# Patient Record
Sex: MALE | Race: BLACK OR AFRICAN AMERICAN | NOT HISPANIC OR LATINO | Employment: OTHER | URBAN - METROPOLITAN AREA
[De-identification: names, ages, dates, MRNs, and addresses within clinical notes are randomized per-mention and may not be internally consistent; named-entity substitution may affect disease eponyms.]

---

## 2019-01-01 ENCOUNTER — TELEPHONE (OUTPATIENT)
Dept: GASTROENTEROLOGY | Facility: CLINIC | Age: 74
End: 2019-01-01

## 2019-01-01 ENCOUNTER — DOCUMENTATION ONLY (OUTPATIENT)
Dept: INFUSION THERAPY | Facility: HOSPITAL | Age: 74
End: 2019-01-01

## 2019-01-01 ENCOUNTER — OFFICE VISIT (OUTPATIENT)
Dept: HEMATOLOGY/ONCOLOGY | Facility: CLINIC | Age: 74
End: 2019-01-01
Payer: MEDICARE

## 2019-01-01 ENCOUNTER — TELEPHONE (OUTPATIENT)
Dept: NEUROLOGY | Facility: HOSPITAL | Age: 74
End: 2019-01-01

## 2019-01-01 ENCOUNTER — DOCUMENTATION ONLY (OUTPATIENT)
Dept: PHARMACY | Facility: AMBULARY SURGERY CENTER | Age: 74
End: 2019-01-01

## 2019-01-01 ENCOUNTER — LAB VISIT (OUTPATIENT)
Dept: LAB | Facility: HOSPITAL | Age: 74
End: 2019-01-01
Attending: INTERNAL MEDICINE
Payer: MEDICARE

## 2019-01-01 ENCOUNTER — TELEPHONE (OUTPATIENT)
Dept: ENDOSCOPY | Facility: HOSPITAL | Age: 74
End: 2019-01-01

## 2019-01-01 ENCOUNTER — INFUSION (OUTPATIENT)
Dept: INFUSION THERAPY | Facility: HOSPITAL | Age: 74
End: 2019-01-01
Attending: INTERNAL MEDICINE
Payer: MEDICARE

## 2019-01-01 ENCOUNTER — DOCUMENTATION ONLY (OUTPATIENT)
Dept: NEUROLOGY | Facility: HOSPITAL | Age: 74
End: 2019-01-01

## 2019-01-01 ENCOUNTER — HOSPITAL ENCOUNTER (OUTPATIENT)
Dept: RADIOLOGY | Facility: HOSPITAL | Age: 74
Discharge: HOME | End: 2019-03-22
Attending: INTERNAL MEDICINE
Payer: MEDICARE

## 2019-01-01 ENCOUNTER — TELEPHONE (OUTPATIENT)
Dept: HEMATOLOGY/ONCOLOGY | Facility: CLINIC | Age: 74
End: 2019-01-01

## 2019-01-01 ENCOUNTER — ANESTHESIA EVENT (OUTPATIENT)
Dept: ENDOSCOPY | Facility: HOSPITAL | Age: 74
End: 2019-01-01
Payer: MEDICARE

## 2019-01-01 ENCOUNTER — ANESTHESIA EVENT (OUTPATIENT)
Dept: ENDOSCOPY | Facility: HOSPITAL | Age: 74
DRG: 436 | End: 2019-01-01
Payer: MEDICARE

## 2019-01-01 ENCOUNTER — ANESTHESIA (OUTPATIENT)
Dept: SURGERY | Facility: HOSPITAL | Age: 74
DRG: 436 | End: 2019-01-01
Payer: MEDICARE

## 2019-01-01 ENCOUNTER — DOCUMENTATION ONLY (OUTPATIENT)
Dept: HEMATOLOGY/ONCOLOGY | Facility: CLINIC | Age: 74
End: 2019-01-01

## 2019-01-01 ENCOUNTER — HOSPITAL ENCOUNTER (INPATIENT)
Facility: HOSPITAL | Age: 74
LOS: 4 days | Discharge: HOME | DRG: 436 | End: 2019-03-15
Attending: SURGERY | Admitting: SURGERY
Payer: MEDICARE

## 2019-01-01 ENCOUNTER — HOSPITAL ENCOUNTER (OUTPATIENT)
Facility: HOSPITAL | Age: 74
Discharge: HOME | End: 2019-02-05
Attending: INTERNAL MEDICINE | Admitting: INTERNAL MEDICINE
Payer: MEDICARE

## 2019-01-01 ENCOUNTER — INITIAL CONSULT (OUTPATIENT)
Dept: HEMATOLOGY/ONCOLOGY | Facility: CLINIC | Age: 74
End: 2019-01-01
Payer: MEDICARE

## 2019-01-01 ENCOUNTER — OFFICE VISIT (OUTPATIENT)
Dept: NEUROLOGY | Facility: HOSPITAL | Age: 74
DRG: 436 | End: 2019-01-01
Attending: SURGERY
Payer: MEDICARE

## 2019-01-01 ENCOUNTER — ANESTHESIA EVENT (OUTPATIENT)
Dept: SURGERY | Facility: HOSPITAL | Age: 74
DRG: 436 | End: 2019-01-01
Payer: MEDICARE

## 2019-01-01 ENCOUNTER — ANESTHESIA (OUTPATIENT)
Dept: ENDOSCOPY | Facility: HOSPITAL | Age: 74
End: 2019-01-01
Payer: MEDICARE

## 2019-01-01 ENCOUNTER — HOSPITAL ENCOUNTER (OUTPATIENT)
Dept: RADIOLOGY | Facility: HOSPITAL | Age: 74
Discharge: HOME | DRG: 436 | End: 2019-03-11
Attending: SURGERY
Payer: MEDICARE

## 2019-01-01 ENCOUNTER — ANESTHESIA (OUTPATIENT)
Dept: ENDOSCOPY | Facility: HOSPITAL | Age: 74
DRG: 436 | End: 2019-01-01
Payer: MEDICARE

## 2019-01-01 VITALS
WEIGHT: 108.44 LBS | HEIGHT: 66 IN | RESPIRATION RATE: 18 BRPM | DIASTOLIC BLOOD PRESSURE: 72 MMHG | HEART RATE: 99 BPM | TEMPERATURE: 98 F | SYSTOLIC BLOOD PRESSURE: 98 MMHG | BODY MASS INDEX: 17.43 KG/M2

## 2019-01-01 VITALS
TEMPERATURE: 98 F | SYSTOLIC BLOOD PRESSURE: 135 MMHG | OXYGEN SATURATION: 96 % | BODY MASS INDEX: 19.2 KG/M2 | WEIGHT: 119.5 LBS | HEIGHT: 66 IN | HEART RATE: 86 BPM | DIASTOLIC BLOOD PRESSURE: 73 MMHG | RESPIRATION RATE: 20 BRPM

## 2019-01-01 VITALS
BODY MASS INDEX: 19 KG/M2 | RESPIRATION RATE: 18 BRPM | SYSTOLIC BLOOD PRESSURE: 99 MMHG | HEART RATE: 116 BPM | WEIGHT: 117.75 LBS | TEMPERATURE: 98 F | DIASTOLIC BLOOD PRESSURE: 78 MMHG | OXYGEN SATURATION: 96 %

## 2019-01-01 VITALS
HEIGHT: 66 IN | HEART RATE: 108 BPM | TEMPERATURE: 98 F | SYSTOLIC BLOOD PRESSURE: 108 MMHG | DIASTOLIC BLOOD PRESSURE: 80 MMHG | WEIGHT: 119.25 LBS | BODY MASS INDEX: 19.16 KG/M2

## 2019-01-01 VITALS
WEIGHT: 115.5 LBS | DIASTOLIC BLOOD PRESSURE: 100 MMHG | HEART RATE: 109 BPM | TEMPERATURE: 98 F | HEIGHT: 66 IN | RESPIRATION RATE: 18 BRPM | BODY MASS INDEX: 18.56 KG/M2 | SYSTOLIC BLOOD PRESSURE: 154 MMHG

## 2019-01-01 VITALS
DIASTOLIC BLOOD PRESSURE: 92 MMHG | RESPIRATION RATE: 18 BRPM | HEIGHT: 66 IN | HEIGHT: 66 IN | SYSTOLIC BLOOD PRESSURE: 132 MMHG | HEART RATE: 88 BPM | BODY MASS INDEX: 17.68 KG/M2 | RESPIRATION RATE: 16 BRPM | BODY MASS INDEX: 17.43 KG/M2 | DIASTOLIC BLOOD PRESSURE: 91 MMHG | WEIGHT: 110 LBS | WEIGHT: 108.44 LBS | HEART RATE: 103 BPM | TEMPERATURE: 98 F | TEMPERATURE: 98 F | SYSTOLIC BLOOD PRESSURE: 129 MMHG

## 2019-01-01 VITALS
DIASTOLIC BLOOD PRESSURE: 100 MMHG | RESPIRATION RATE: 20 BRPM | TEMPERATURE: 98 F | BODY MASS INDEX: 20.09 KG/M2 | OXYGEN SATURATION: 99 % | HEART RATE: 78 BPM | SYSTOLIC BLOOD PRESSURE: 147 MMHG | HEIGHT: 66 IN | WEIGHT: 125 LBS

## 2019-01-01 VITALS
RESPIRATION RATE: 18 BRPM | TEMPERATURE: 98 F | DIASTOLIC BLOOD PRESSURE: 81 MMHG | HEART RATE: 98 BPM | WEIGHT: 111.13 LBS | BODY MASS INDEX: 17.86 KG/M2 | SYSTOLIC BLOOD PRESSURE: 140 MMHG | RESPIRATION RATE: 18 BRPM | HEART RATE: 93 BPM | HEIGHT: 66 IN | SYSTOLIC BLOOD PRESSURE: 113 MMHG | DIASTOLIC BLOOD PRESSURE: 97 MMHG | HEIGHT: 66 IN | WEIGHT: 110 LBS | BODY MASS INDEX: 17.68 KG/M2 | TEMPERATURE: 98 F

## 2019-01-01 VITALS
HEART RATE: 86 BPM | BODY MASS INDEX: 17.43 KG/M2 | DIASTOLIC BLOOD PRESSURE: 75 MMHG | TEMPERATURE: 98 F | SYSTOLIC BLOOD PRESSURE: 102 MMHG | HEIGHT: 66 IN | RESPIRATION RATE: 16 BRPM | WEIGHT: 108.44 LBS

## 2019-01-01 VITALS
HEART RATE: 112 BPM | SYSTOLIC BLOOD PRESSURE: 93 MMHG | RESPIRATION RATE: 18 BRPM | HEIGHT: 66 IN | TEMPERATURE: 98 F | DIASTOLIC BLOOD PRESSURE: 68 MMHG | WEIGHT: 110 LBS | BODY MASS INDEX: 17.68 KG/M2

## 2019-01-01 VITALS
RESPIRATION RATE: 18 BRPM | SYSTOLIC BLOOD PRESSURE: 102 MMHG | DIASTOLIC BLOOD PRESSURE: 72 MMHG | HEIGHT: 66 IN | HEART RATE: 103 BPM | WEIGHT: 105.81 LBS | BODY MASS INDEX: 17 KG/M2 | TEMPERATURE: 98 F

## 2019-01-01 VITALS
BODY MASS INDEX: 17 KG/M2 | WEIGHT: 105.81 LBS | HEART RATE: 84 BPM | TEMPERATURE: 98 F | DIASTOLIC BLOOD PRESSURE: 80 MMHG | RESPIRATION RATE: 18 BRPM | SYSTOLIC BLOOD PRESSURE: 124 MMHG | HEIGHT: 66 IN

## 2019-01-01 VITALS
BODY MASS INDEX: 17.43 KG/M2 | DIASTOLIC BLOOD PRESSURE: 92 MMHG | WEIGHT: 108.44 LBS | HEIGHT: 66 IN | RESPIRATION RATE: 18 BRPM | TEMPERATURE: 98 F | HEART RATE: 103 BPM | SYSTOLIC BLOOD PRESSURE: 129 MMHG

## 2019-01-01 VITALS
SYSTOLIC BLOOD PRESSURE: 121 MMHG | BODY MASS INDEX: 17.68 KG/M2 | DIASTOLIC BLOOD PRESSURE: 79 MMHG | WEIGHT: 110 LBS | HEIGHT: 66 IN | HEART RATE: 88 BPM

## 2019-01-01 DIAGNOSIS — R53.1 WEAKNESS: ICD-10-CM

## 2019-01-01 DIAGNOSIS — R17 JAUNDICE: ICD-10-CM

## 2019-01-01 DIAGNOSIS — K31.1 GASTRIC OUTLET OBSTRUCTION: ICD-10-CM

## 2019-01-01 DIAGNOSIS — C25.0 MALIGNANT NEOPLASM OF HEAD OF PANCREAS: Primary | ICD-10-CM

## 2019-01-01 DIAGNOSIS — R13.10 DYSPHAGIA, UNSPECIFIED TYPE: ICD-10-CM

## 2019-01-01 DIAGNOSIS — C25.9 MALIGNANT NEOPLASM OF PANCREAS, UNSPECIFIED LOCATION OF MALIGNANCY: ICD-10-CM

## 2019-01-01 DIAGNOSIS — R63.4 WEIGHT LOSS: ICD-10-CM

## 2019-01-01 DIAGNOSIS — C25.9 MALIGNANT NEOPLASM OF PANCREAS, UNSPECIFIED LOCATION OF MALIGNANCY: Primary | ICD-10-CM

## 2019-01-01 DIAGNOSIS — R00.0 TACHYCARDIA: ICD-10-CM

## 2019-01-01 DIAGNOSIS — C25.0 MALIGNANT NEOPLASM OF HEAD OF PANCREAS: ICD-10-CM

## 2019-01-01 DIAGNOSIS — E11.65 TYPE 2 DIABETES MELLITUS WITH HYPERGLYCEMIA, WITH LONG-TERM CURRENT USE OF INSULIN: ICD-10-CM

## 2019-01-01 DIAGNOSIS — E83.42 HYPOMAGNESEMIA: ICD-10-CM

## 2019-01-01 DIAGNOSIS — D64.81 ANTINEOPLASTIC CHEMOTHERAPY INDUCED ANEMIA: ICD-10-CM

## 2019-01-01 DIAGNOSIS — E43 SEVERE MALNUTRITION: ICD-10-CM

## 2019-01-01 DIAGNOSIS — T45.1X5A ANTINEOPLASTIC CHEMOTHERAPY INDUCED ANEMIA: ICD-10-CM

## 2019-01-01 DIAGNOSIS — K86.89 MASS OF PANCREAS: Primary | ICD-10-CM

## 2019-01-01 DIAGNOSIS — R63.6 UNDERWEIGHT: ICD-10-CM

## 2019-01-01 DIAGNOSIS — Z71.89 ADVANCE CARE PLANNING: ICD-10-CM

## 2019-01-01 DIAGNOSIS — F41.9 ANXIETY: Primary | ICD-10-CM

## 2019-01-01 DIAGNOSIS — R11.2 NAUSEA AND VOMITING, INTRACTABILITY OF VOMITING NOT SPECIFIED, UNSPECIFIED VOMITING TYPE: ICD-10-CM

## 2019-01-01 DIAGNOSIS — Z79.4 TYPE 2 DIABETES MELLITUS WITH HYPERGLYCEMIA, WITH LONG-TERM CURRENT USE OF INSULIN: ICD-10-CM

## 2019-01-01 LAB
ALBUMIN SERPL BCP-MCNC: 3.5 G/DL
ALBUMIN SERPL BCP-MCNC: 3.7 G/DL
ALBUMIN SERPL BCP-MCNC: 4 G/DL
ALBUMIN SERPL BCP-MCNC: 4.6 G/DL (ref 3.5–5.2)
ALP SERPL-CCNC: 112 U/L (ref 38–145)
ALP SERPL-CCNC: 131 U/L
ALP SERPL-CCNC: 138 U/L
ALP SERPL-CCNC: 152 U/L
ALT SERPL W/O P-5'-P-CCNC: 10 U/L (ref 10–44)
ALT SERPL W/O P-5'-P-CCNC: 13 U/L
ALT SERPL W/O P-5'-P-CCNC: 14 U/L
ALT SERPL W/O P-5'-P-CCNC: 20 U/L
ANION GAP SERPL CALC-SCNC: 12 MMOL/L (ref 8–16)
ANION GAP SERPL CALC-SCNC: 13 MMOL/L
ANION GAP SERPL CALC-SCNC: 8 MMOL/L
ANION GAP SERPL CALC-SCNC: 9 MMOL/L
AST SERPL-CCNC: 13 U/L
AST SERPL-CCNC: 16 U/L
AST SERPL-CCNC: 22 U/L (ref 17–59)
AST SERPL-CCNC: 23 U/L
BASOPHILS # BLD AUTO: 0.01 K/UL
BASOPHILS # BLD AUTO: 0.03 K/UL
BASOPHILS # BLD AUTO: 0.03 K/UL (ref 0–0.2)
BASOPHILS NFR BLD: 0.1 %
BASOPHILS NFR BLD: 0.3 %
BASOPHILS NFR BLD: 0.3 % (ref 0–1.9)
BILIRUB SERPL-MCNC: 1.2 MG/DL (ref 0.2–1.3)
BILIRUB SERPL-MCNC: 1.3 MG/DL
BUN SERPL-MCNC: 11 MG/DL (ref 9–21)
BUN SERPL-MCNC: 13 MG/DL
BUN SERPL-MCNC: 13 MG/DL
BUN SERPL-MCNC: 15 MG/DL
CALCIUM SERPL-MCNC: 10.1 MG/DL
CALCIUM SERPL-MCNC: 10.2 MG/DL (ref 8.4–10.2)
CALCIUM SERPL-MCNC: 10.9 MG/DL
CALCIUM SERPL-MCNC: 9.8 MG/DL
CANCER AG19-9 SERPL-ACNC: 1555 U/ML (ref 2–40)
CANCER AG19-9 SERPL-ACNC: 2359 U/ML
CANCER AG19-9 SERPL-ACNC: 3354 U/ML
CHLORIDE SERPL-SCNC: 102 MMOL/L (ref 95–110)
CHLORIDE SERPL-SCNC: 103 MMOL/L
CHLORIDE SERPL-SCNC: 103 MMOL/L
CHLORIDE SERPL-SCNC: 99 MMOL/L
CO2 SERPL-SCNC: 23 MMOL/L
CO2 SERPL-SCNC: 24 MMOL/L
CO2 SERPL-SCNC: 27 MMOL/L
CO2 SERPL-SCNC: 27 MMOL/L (ref 22–31)
CREAT SERPL-MCNC: 0.72 MG/DL (ref 0.5–1.4)
CREAT SERPL-MCNC: 0.8 MG/DL
CREAT SERPL-MCNC: 0.9 MG/DL
CREAT SERPL-MCNC: 0.9 MG/DL
DIFFERENTIAL METHOD: ABNORMAL
DIFFERENTIAL METHOD: ABNORMAL
DIFFERENTIAL METHOD: NORMAL
EOSINOPHIL # BLD AUTO: 0.1 K/UL
EOSINOPHIL # BLD AUTO: 0.1 K/UL (ref 0–0.5)
EOSINOPHIL # BLD AUTO: 0.2 K/UL
EOSINOPHIL NFR BLD: 0.8 % (ref 0–8)
EOSINOPHIL NFR BLD: 1.5 %
EOSINOPHIL NFR BLD: 1.9 %
ERYTHROCYTE [DISTWIDTH] IN BLOOD BY AUTOMATED COUNT: 14.3 % (ref 11.5–14.5)
ERYTHROCYTE [DISTWIDTH] IN BLOOD BY AUTOMATED COUNT: 14.8 %
ERYTHROCYTE [DISTWIDTH] IN BLOOD BY AUTOMATED COUNT: 14.9 %
EST. GFR  (AFRICAN AMERICAN): >60 ML/MIN/1.73 M^2
EST. GFR  (NON AFRICAN AMERICAN): >60 ML/MIN/1.73 M^2
GLUCOSE SERPL-MCNC: 120 MG/DL (ref 70–110)
GLUCOSE SERPL-MCNC: 147 MG/DL (ref 70–110)
GLUCOSE SERPL-MCNC: 183 MG/DL
GLUCOSE SERPL-MCNC: 231 MG/DL
GLUCOSE SERPL-MCNC: 249 MG/DL
HCT VFR BLD AUTO: 42.4 %
HCT VFR BLD AUTO: 45 % (ref 40–54)
HCT VFR BLD AUTO: 46 %
HGB BLD-MCNC: 13.7 G/DL
HGB BLD-MCNC: 14.6 G/DL
HGB BLD-MCNC: 14.7 G/DL (ref 14–18)
IMM GRANULOCYTES # BLD AUTO: 0.04 K/UL (ref 0–0.04)
IMM GRANULOCYTES NFR BLD AUTO: 0.5 % (ref 0–0.5)
INR PPP: 1.1
LDH SERPL L TO P-CCNC: 432 U/L (ref 313–618)
LYMPHOCYTES # BLD AUTO: 1.6 K/UL
LYMPHOCYTES # BLD AUTO: 1.9 K/UL (ref 1–4.8)
LYMPHOCYTES # BLD AUTO: 2.1 K/UL
LYMPHOCYTES NFR BLD: 18.8 %
LYMPHOCYTES NFR BLD: 21.1 % (ref 18–48)
LYMPHOCYTES NFR BLD: 23.3 %
MAGNESIUM SERPL-MCNC: 1.4 MG/DL (ref 1.6–2.6)
MAGNESIUM SERPL-MCNC: 1.9 MG/DL
MCH RBC QN AUTO: 29.7 PG
MCH RBC QN AUTO: 30 PG
MCH RBC QN AUTO: 30.1 PG (ref 27–31)
MCHC RBC AUTO-ENTMCNC: 31.7 G/DL
MCHC RBC AUTO-ENTMCNC: 32.3 G/DL
MCHC RBC AUTO-ENTMCNC: 32.7 G/DL (ref 32–36)
MCV RBC AUTO: 92 FL
MCV RBC AUTO: 92 FL (ref 82–98)
MCV RBC AUTO: 95 FL
MONOCYTES # BLD AUTO: 0.6 K/UL (ref 0.3–1)
MONOCYTES # BLD AUTO: 0.9 K/UL
MONOCYTES # BLD AUTO: 1.1 K/UL
MONOCYTES NFR BLD: 10 %
MONOCYTES NFR BLD: 12.8 %
MONOCYTES NFR BLD: 6.8 % (ref 4–15)
NEUTROPHILS # BLD AUTO: 5.8 K/UL
NEUTROPHILS # BLD AUTO: 5.8 K/UL
NEUTROPHILS # BLD AUTO: 6.2 K/UL (ref 1.8–7.7)
NEUTROPHILS NFR BLD: 64.5 %
NEUTROPHILS NFR BLD: 66.6 %
NEUTROPHILS NFR BLD: 70.5 % (ref 38–73)
NRBC BLD-RTO: 0 /100 WBC
PHOSPHATE SERPL-MCNC: 3.4 MG/DL
PLATELET # BLD AUTO: 199 K/UL
PLATELET # BLD AUTO: 235 K/UL
PLATELET # BLD AUTO: 266 K/UL (ref 150–350)
PLATELET BLD QL SMEAR: ABNORMAL
PMV BLD AUTO: 9.3 FL (ref 9.2–12.9)
PMV BLD AUTO: 9.5 FL
PMV BLD AUTO: 9.5 FL
POCT GLUCOSE: 147 MG/DL (ref 70–110)
POCT GLUCOSE: 160 MG/DL (ref 70–110)
POCT GLUCOSE: 177 MG/DL (ref 70–110)
POCT GLUCOSE: 190 MG/DL (ref 70–110)
POCT GLUCOSE: 196 MG/DL (ref 70–110)
POCT GLUCOSE: 203 MG/DL (ref 70–110)
POCT GLUCOSE: 219 MG/DL (ref 70–110)
POCT GLUCOSE: 220 MG/DL (ref 70–110)
POCT GLUCOSE: 236 MG/DL (ref 70–110)
POCT GLUCOSE: 243 MG/DL (ref 70–110)
POCT GLUCOSE: 243 MG/DL (ref 70–110)
POCT GLUCOSE: 252 MG/DL (ref 70–110)
POCT GLUCOSE: 269 MG/DL (ref 70–110)
POCT GLUCOSE: 269 MG/DL (ref 70–110)
POCT GLUCOSE: 277 MG/DL (ref 70–110)
POCT GLUCOSE: 281 MG/DL (ref 70–110)
POTASSIUM SERPL-SCNC: 3.6 MMOL/L (ref 3.5–5.1)
POTASSIUM SERPL-SCNC: 3.8 MMOL/L
POTASSIUM SERPL-SCNC: 4.3 MMOL/L
POTASSIUM SERPL-SCNC: 4.6 MMOL/L
PREALB SERPL-MCNC: 18 MG/DL
PROT SERPL-MCNC: 7.3 G/DL
PROT SERPL-MCNC: 7.6 G/DL
PROT SERPL-MCNC: 8.4 G/DL (ref 6–8.4)
PROT SERPL-MCNC: 8.7 G/DL
PROTHROMBIN TIME: 11.5 SEC
RBC # BLD AUTO: 4.62 M/UL
RBC # BLD AUTO: 4.86 M/UL
RBC # BLD AUTO: 4.88 M/UL (ref 4.6–6.2)
SODIUM SERPL-SCNC: 135 MMOL/L
SODIUM SERPL-SCNC: 135 MMOL/L
SODIUM SERPL-SCNC: 139 MMOL/L
SODIUM SERPL-SCNC: 141 MMOL/L (ref 136–145)
WBC # BLD AUTO: 8.74 K/UL
WBC # BLD AUTO: 8.83 K/UL (ref 3.9–12.7)
WBC # BLD AUTO: 9.01 K/UL

## 2019-01-01 PROCEDURE — 99900035 HC TECH TIME PER 15 MIN (STAT)

## 2019-01-01 PROCEDURE — 96413 CHEMO IV INFUSION 1 HR: CPT | Mod: PN

## 2019-01-01 PROCEDURE — 88112 CYTOLOGY SPECIMEN- FNA RADIOLOGY GUIDED, BRONCH/EBUS, EUS/GI: ICD-10-PCS | Mod: 26,59,, | Performed by: PATHOLOGY

## 2019-01-01 PROCEDURE — 88313 SPECIAL STAINS GROUP 2: CPT | Mod: 26,,, | Performed by: PATHOLOGY

## 2019-01-01 PROCEDURE — A4216 STERILE WATER/SALINE, 10 ML: HCPCS | Mod: PN | Performed by: NURSE PRACTITIONER

## 2019-01-01 PROCEDURE — 80053 COMPREHEN METABOLIC PANEL: CPT

## 2019-01-01 PROCEDURE — 25000003 PHARM REV CODE 250: Performed by: SURGERY

## 2019-01-01 PROCEDURE — 99999 PR PBB SHADOW E&M-EST. PATIENT-LVL III: CPT | Mod: PBBFAC,,, | Performed by: INTERNAL MEDICINE

## 2019-01-01 PROCEDURE — 63600175 PHARM REV CODE 636 W HCPCS: Mod: JG,PN | Performed by: INTERNAL MEDICINE

## 2019-01-01 PROCEDURE — 25000003 PHARM REV CODE 250: Mod: PN | Performed by: NURSE PRACTITIONER

## 2019-01-01 PROCEDURE — 94761 N-INVAS EAR/PLS OXIMETRY MLT: CPT

## 2019-01-01 PROCEDURE — 63600175 PHARM REV CODE 636 W HCPCS: Performed by: SURGERY

## 2019-01-01 PROCEDURE — 63600175 PHARM REV CODE 636 W HCPCS: Performed by: NURSE ANESTHETIST, CERTIFIED REGISTERED

## 2019-01-01 PROCEDURE — 96417 CHEMO IV INFUS EACH ADDL SEQ: CPT | Mod: PN

## 2019-01-01 PROCEDURE — 27202059 HC NEEDLE, FNA (ANY): Performed by: INTERNAL MEDICINE

## 2019-01-01 PROCEDURE — 88305 TISSUE SPECIMEN TO PATHOLOGY - SURGERY: ICD-10-PCS | Mod: 26,,, | Performed by: PATHOLOGY

## 2019-01-01 PROCEDURE — 94799 UNLISTED PULMONARY SVC/PX: CPT

## 2019-01-01 PROCEDURE — 88342 IMHCHEM/IMCYTCHM 1ST ANTB: CPT | Mod: 26,,, | Performed by: PATHOLOGY

## 2019-01-01 PROCEDURE — 88313 TISSUE SPECIMEN TO PATHOLOGY - SURGERY: ICD-10-PCS | Mod: 26,,, | Performed by: PATHOLOGY

## 2019-01-01 PROCEDURE — 88173 CYTOLOGY SPECIMEN- FNA RADIOLOGY GUIDED, BRONCH/EBUS, EUS/GI: ICD-10-PCS | Mod: 26,,, | Performed by: PATHOLOGY

## 2019-01-01 PROCEDURE — 99214 PR OFFICE/OUTPT VISIT, EST, LEVL IV, 30-39 MIN: ICD-10-PCS | Mod: S$PBB,,, | Performed by: NURSE PRACTITIONER

## 2019-01-01 PROCEDURE — 36593 DECLOT VASCULAR DEVICE: CPT | Mod: PN

## 2019-01-01 PROCEDURE — 96375 TX/PRO/DX INJ NEW DRUG ADDON: CPT | Mod: PN

## 2019-01-01 PROCEDURE — 99999 PR PBB SHADOW E&M-EST. PATIENT-LVL V: CPT | Mod: PBBFAC,,, | Performed by: NURSE PRACTITIONER

## 2019-01-01 PROCEDURE — 25000003 PHARM REV CODE 250: Performed by: NURSE ANESTHETIST, CERTIFIED REGISTERED

## 2019-01-01 PROCEDURE — C1788 PORT, INDWELLING, IMP: HCPCS | Performed by: SURGERY

## 2019-01-01 PROCEDURE — 99213 OFFICE O/P EST LOW 20 MIN: CPT | Mod: PBBFAC,PN,25 | Performed by: INTERNAL MEDICINE

## 2019-01-01 PROCEDURE — 99215 PR OFFICE/OUTPT VISIT, EST, LEVL V, 40-54 MIN: ICD-10-PCS | Mod: S$PBB,,, | Performed by: INTERNAL MEDICINE

## 2019-01-01 PROCEDURE — 25000003 PHARM REV CODE 250: Performed by: STUDENT IN AN ORGANIZED HEALTH CARE EDUCATION/TRAINING PROGRAM

## 2019-01-01 PROCEDURE — 43238 PR UPGI ENDOSCOPY W/US FN BX: ICD-10-PCS | Mod: ,,, | Performed by: INTERNAL MEDICINE

## 2019-01-01 PROCEDURE — 74178 CT ABD&PLV WO CNTR FLWD CNTR: CPT | Mod: TC,PO

## 2019-01-01 PROCEDURE — 85610 PROTHROMBIN TIME: CPT

## 2019-01-01 PROCEDURE — 86301 IMMUNOASSAY TUMOR CA 19-9: CPT

## 2019-01-01 PROCEDURE — 88342 TISSUE SPECIMEN TO PATHOLOGY - SURGERY: ICD-10-PCS | Mod: 26,,, | Performed by: PATHOLOGY

## 2019-01-01 PROCEDURE — 63600175 PHARM REV CODE 636 W HCPCS: Mod: PN | Performed by: NURSE PRACTITIONER

## 2019-01-01 PROCEDURE — 37000008 HC ANESTHESIA 1ST 15 MINUTES: Performed by: SURGERY

## 2019-01-01 PROCEDURE — 36415 COLL VENOUS BLD VENIPUNCTURE: CPT

## 2019-01-01 PROCEDURE — 84100 ASSAY OF PHOSPHORUS: CPT

## 2019-01-01 PROCEDURE — 99214 OFFICE O/P EST MOD 30 MIN: CPT | Mod: S$PBB,,, | Performed by: INTERNAL MEDICINE

## 2019-01-01 PROCEDURE — 25500020 PHARM REV CODE 255: Mod: PO | Performed by: INTERNAL MEDICINE

## 2019-01-01 PROCEDURE — 43238 EGD US FINE NEEDLE BX/ASPIR: CPT | Performed by: INTERNAL MEDICINE

## 2019-01-01 PROCEDURE — 63600175 PHARM REV CODE 636 W HCPCS: Mod: PN | Performed by: INTERNAL MEDICINE

## 2019-01-01 PROCEDURE — 99214 PR OFFICE/OUTPT VISIT, EST, LEVL IV, 30-39 MIN: ICD-10-PCS | Mod: S$PBB,,, | Performed by: INTERNAL MEDICINE

## 2019-01-01 PROCEDURE — 99999 PR PBB SHADOW E&M-EST. PATIENT-LVL V: ICD-10-PCS | Mod: PBBFAC,,, | Performed by: NURSE PRACTITIONER

## 2019-01-01 PROCEDURE — 99223 PR INITIAL HOSPITAL CARE,LEVL III: ICD-10-PCS | Mod: ,,, | Performed by: INTERNAL MEDICINE

## 2019-01-01 PROCEDURE — 88173 CYTOPATH EVAL FNA REPORT: CPT | Mod: 26,,, | Performed by: PATHOLOGY

## 2019-01-01 PROCEDURE — 74220 X-RAY XM ESOPHAGUS 1CNTRST: CPT | Mod: 26,,, | Performed by: RADIOLOGY

## 2019-01-01 PROCEDURE — 43238 EGD US FINE NEEDLE BX/ASPIR: CPT | Mod: ,,, | Performed by: INTERNAL MEDICINE

## 2019-01-01 PROCEDURE — 93010 ELECTROCARDIOGRAM REPORT: CPT | Mod: ,,, | Performed by: INTERNAL MEDICINE

## 2019-01-01 PROCEDURE — 96365 THER/PROPH/DIAG IV INF INIT: CPT | Mod: PN

## 2019-01-01 PROCEDURE — 37000009 HC ANESTHESIA EA ADD 15 MINS: Performed by: INTERNAL MEDICINE

## 2019-01-01 PROCEDURE — 99215 OFFICE O/P EST HI 40 MIN: CPT | Mod: PBBFAC,25,PN | Performed by: NURSE PRACTITIONER

## 2019-01-01 PROCEDURE — 88172 CYTOLOGY SPECIMEN- FNA RADIOLOGY GUIDED, BRONCH/EBUS, EUS/GI: ICD-10-PCS | Mod: 26,,, | Performed by: PATHOLOGY

## 2019-01-01 PROCEDURE — 11000001 HC ACUTE MED/SURG PRIVATE ROOM

## 2019-01-01 PROCEDURE — 71260 CT CHEST WITH CONTRAST: ICD-10-PCS | Mod: 26,,, | Performed by: RADIOLOGY

## 2019-01-01 PROCEDURE — 74220 X-RAY XM ESOPHAGUS 1CNTRST: CPT | Mod: TC

## 2019-01-01 PROCEDURE — 83735 ASSAY OF MAGNESIUM: CPT

## 2019-01-01 PROCEDURE — 99999 PR PBB SHADOW E&M-EST. PATIENT-LVL III: ICD-10-PCS | Mod: PBBFAC,,, | Performed by: INTERNAL MEDICINE

## 2019-01-01 PROCEDURE — 71000015 HC POSTOP RECOV 1ST HR: Performed by: SURGERY

## 2019-01-01 PROCEDURE — 85025 COMPLETE CBC W/AUTO DIFF WBC: CPT

## 2019-01-01 PROCEDURE — 71260 CT THORAX DX C+: CPT | Mod: 26,,, | Performed by: RADIOLOGY

## 2019-01-01 PROCEDURE — 43239 EGD BIOPSY SINGLE/MULTIPLE: CPT | Performed by: INTERNAL MEDICINE

## 2019-01-01 PROCEDURE — 93010 EKG 12-LEAD: ICD-10-PCS | Mod: ,,, | Performed by: INTERNAL MEDICINE

## 2019-01-01 PROCEDURE — A4216 STERILE WATER/SALINE, 10 ML: HCPCS | Mod: PN | Performed by: INTERNAL MEDICINE

## 2019-01-01 PROCEDURE — 74220 FL ESOPHAGRAM COMPLETE: ICD-10-PCS | Mod: 26,,, | Performed by: RADIOLOGY

## 2019-01-01 PROCEDURE — 99214 OFFICE O/P EST MOD 30 MIN: CPT | Mod: S$PBB,,, | Performed by: NURSE PRACTITIONER

## 2019-01-01 PROCEDURE — 36000707: Performed by: SURGERY

## 2019-01-01 PROCEDURE — 88305 TISSUE EXAM BY PATHOLOGIST: CPT | Mod: 26,,, | Performed by: PATHOLOGY

## 2019-01-01 PROCEDURE — 97803 MED NUTRITION INDIV SUBSEQ: CPT

## 2019-01-01 PROCEDURE — 25000003 PHARM REV CODE 250: Mod: PN | Performed by: INTERNAL MEDICINE

## 2019-01-01 PROCEDURE — 85025 COMPLETE CBC W/AUTO DIFF WBC: CPT | Mod: PN

## 2019-01-01 PROCEDURE — 93005 ELECTROCARDIOGRAM TRACING: CPT

## 2019-01-01 PROCEDURE — B4185 PARENTERAL SOL 10 GM LIPIDS: HCPCS | Performed by: SURGERY

## 2019-01-01 PROCEDURE — 80053 COMPREHEN METABOLIC PANEL: CPT | Mod: PN

## 2019-01-01 PROCEDURE — 88172 CYTP DX EVAL FNA 1ST EA SITE: CPT | Mod: 26,,, | Performed by: PATHOLOGY

## 2019-01-01 PROCEDURE — 71260 CT THORAX DX C+: CPT | Mod: TC,PO

## 2019-01-01 PROCEDURE — 99215 OFFICE O/P EST HI 40 MIN: CPT | Mod: PBBFAC,PN,25 | Performed by: NURSE PRACTITIONER

## 2019-01-01 PROCEDURE — 25000003 PHARM REV CODE 250: Performed by: INTERNAL MEDICINE

## 2019-01-01 PROCEDURE — 99223 1ST HOSP IP/OBS HIGH 75: CPT | Mod: ,,, | Performed by: INTERNAL MEDICINE

## 2019-01-01 PROCEDURE — 88112 CYTOPATH CELL ENHANCE TECH: CPT | Mod: 26,59,, | Performed by: PATHOLOGY

## 2019-01-01 PROCEDURE — 74178 CT ABD&PLV WO CNTR FLWD CNTR: CPT | Mod: 26,,, | Performed by: RADIOLOGY

## 2019-01-01 PROCEDURE — 99215 OFFICE O/P EST HI 40 MIN: CPT | Performed by: SURGERY

## 2019-01-01 PROCEDURE — 83615 LACTATE (LD) (LDH) ENZYME: CPT

## 2019-01-01 PROCEDURE — 36000706: Performed by: SURGERY

## 2019-01-01 PROCEDURE — 84134 ASSAY OF PREALBUMIN: CPT

## 2019-01-01 PROCEDURE — 83735 ASSAY OF MAGNESIUM: CPT | Mod: PN

## 2019-01-01 PROCEDURE — 88305 TISSUE EXAM BY PATHOLOGIST: CPT | Performed by: PATHOLOGY

## 2019-01-01 PROCEDURE — 37000008 HC ANESTHESIA 1ST 15 MINUTES: Performed by: INTERNAL MEDICINE

## 2019-01-01 PROCEDURE — 88172 CYTP DX EVAL FNA 1ST EA SITE: CPT | Performed by: PATHOLOGY

## 2019-01-01 PROCEDURE — 63600175 PHARM REV CODE 636 W HCPCS: Performed by: STUDENT IN AN ORGANIZED HEALTH CARE EDUCATION/TRAINING PROGRAM

## 2019-01-01 PROCEDURE — 99213 OFFICE O/P EST LOW 20 MIN: CPT | Mod: PBBFAC,PO | Performed by: INTERNAL MEDICINE

## 2019-01-01 PROCEDURE — 83615 LACTATE (LD) (LDH) ENZYME: CPT | Mod: PN

## 2019-01-01 PROCEDURE — 27201012 HC FORCEPS, HOT/COLD, DISP: Performed by: INTERNAL MEDICINE

## 2019-01-01 PROCEDURE — 99213 OFFICE O/P EST LOW 20 MIN: CPT | Mod: PBBFAC,PN | Performed by: INTERNAL MEDICINE

## 2019-01-01 PROCEDURE — 36415 COLL VENOUS BLD VENIPUNCTURE: CPT | Mod: PN

## 2019-01-01 PROCEDURE — 74178 CT ABDOMEN PELVIS W WO CONTRAST: ICD-10-PCS | Mod: 26,,, | Performed by: RADIOLOGY

## 2019-01-01 PROCEDURE — 37000009 HC ANESTHESIA EA ADD 15 MINS: Performed by: SURGERY

## 2019-01-01 PROCEDURE — 99215 OFFICE O/P EST HI 40 MIN: CPT | Mod: S$PBB,,, | Performed by: INTERNAL MEDICINE

## 2019-01-01 PROCEDURE — 96366 THER/PROPH/DIAG IV INF ADDON: CPT | Mod: PN

## 2019-01-01 DEVICE — PORT POWER CLEAR VIEW: Type: IMPLANTABLE DEVICE | Site: CHEST | Status: FUNCTIONAL

## 2019-01-01 RX ORDER — HEPARIN 100 UNIT/ML
500 SYRINGE INTRAVENOUS
Status: CANCELLED | OUTPATIENT
Start: 2019-01-01

## 2019-01-01 RX ORDER — PROPOFOL 10 MG/ML
VIAL (ML) INTRAVENOUS
Status: DISCONTINUED | OUTPATIENT
Start: 2019-01-01 | End: 2019-01-01

## 2019-01-01 RX ORDER — GLUCAGON 1 MG
1 KIT INJECTION
Status: DISCONTINUED | OUTPATIENT
Start: 2019-01-01 | End: 2019-01-01

## 2019-01-01 RX ORDER — SODIUM CHLORIDE 0.9 % (FLUSH) 0.9 %
10 SYRINGE (ML) INJECTION
Status: DISCONTINUED | OUTPATIENT
Start: 2019-01-01 | End: 2019-01-01 | Stop reason: HOSPADM

## 2019-01-01 RX ORDER — GRANISETRON HYDROCHLORIDE 1 MG/ML
1 INJECTION INTRAVENOUS
Status: CANCELLED
Start: 2019-01-01

## 2019-01-01 RX ORDER — PANTOPRAZOLE SODIUM 40 MG/1
40 TABLET, DELAYED RELEASE ORAL 2 TIMES DAILY
Status: DISCONTINUED | OUTPATIENT
Start: 2019-01-01 | End: 2019-01-01 | Stop reason: HOSPADM

## 2019-01-01 RX ORDER — METFORMIN HYDROCHLORIDE 500 MG/1
TABLET ORAL
Refills: 0 | COMMUNITY
Start: 2018-01-01 | End: 2019-01-01 | Stop reason: ALTCHOICE

## 2019-01-01 RX ORDER — FENTANYL CITRATE 50 UG/ML
INJECTION, SOLUTION INTRAMUSCULAR; INTRAVENOUS
Status: DISCONTINUED | OUTPATIENT
Start: 2019-01-01 | End: 2019-01-01

## 2019-01-01 RX ORDER — SODIUM CHLORIDE 0.9 % (FLUSH) 0.9 %
3 SYRINGE (ML) INJECTION
Status: DISCONTINUED | OUTPATIENT
Start: 2019-01-01 | End: 2019-01-01 | Stop reason: HOSPADM

## 2019-01-01 RX ORDER — METOCLOPRAMIDE 10 MG/1
10 TABLET ORAL 4 TIMES DAILY
Qty: 90 TABLET | Refills: 1 | Status: SHIPPED | OUTPATIENT
Start: 2019-01-01 | End: 2019-01-01

## 2019-01-01 RX ORDER — CYPROHEPTADINE HYDROCHLORIDE 4 MG/1
4 TABLET ORAL 3 TIMES DAILY
Status: DISCONTINUED | OUTPATIENT
Start: 2019-01-01 | End: 2019-01-01 | Stop reason: HOSPADM

## 2019-01-01 RX ORDER — ONDANSETRON 2 MG/ML
INJECTION INTRAMUSCULAR; INTRAVENOUS
Status: DISCONTINUED | OUTPATIENT
Start: 2019-01-01 | End: 2019-01-01

## 2019-01-01 RX ORDER — POTASSIUM CHLORIDE 750 MG/1
10 TABLET, EXTENDED RELEASE ORAL
Status: DISCONTINUED | OUTPATIENT
Start: 2019-01-01 | End: 2019-01-01 | Stop reason: HOSPADM

## 2019-01-01 RX ORDER — GRANISETRON HYDROCHLORIDE 1 MG/ML
1 INJECTION INTRAVENOUS
Status: COMPLETED | OUTPATIENT
Start: 2019-01-01 | End: 2019-01-01

## 2019-01-01 RX ORDER — ONDANSETRON 8 MG/1
TABLET, ORALLY DISINTEGRATING ORAL
Refills: 0 | COMMUNITY
Start: 2018-01-01 | End: 2019-01-01 | Stop reason: DRUGHIGH

## 2019-01-01 RX ORDER — ONDANSETRON 2 MG/ML
4 INJECTION INTRAMUSCULAR; INTRAVENOUS DAILY PRN
Status: DISCONTINUED | OUTPATIENT
Start: 2019-01-01 | End: 2019-01-01 | Stop reason: HOSPADM

## 2019-01-01 RX ORDER — FUROSEMIDE 40 MG/1
40 TABLET ORAL DAILY
Refills: 0 | COMMUNITY
Start: 2019-01-01 | End: 2019-01-01 | Stop reason: ALTCHOICE

## 2019-01-01 RX ORDER — BLOOD-GLUCOSE METER
KIT MISCELLANEOUS
Refills: 0 | COMMUNITY
Start: 2019-01-01 | End: 2019-01-01

## 2019-01-01 RX ORDER — LIDOCAINE HCL/PF 100 MG/5ML
SYRINGE (ML) INTRAVENOUS
Status: DISCONTINUED | OUTPATIENT
Start: 2019-01-01 | End: 2019-01-01

## 2019-01-01 RX ORDER — ROCURONIUM BROMIDE 10 MG/ML
INJECTION, SOLUTION INTRAVENOUS
Status: DISCONTINUED | OUTPATIENT
Start: 2019-01-01 | End: 2019-01-01

## 2019-01-01 RX ORDER — SODIUM CHLORIDE 0.9 % (FLUSH) 0.9 %
10 SYRINGE (ML) INJECTION
Status: CANCELLED | OUTPATIENT
Start: 2019-01-01

## 2019-01-01 RX ORDER — LIDOCAINE HYDROCHLORIDE 10 MG/ML
INJECTION, SOLUTION EPIDURAL; INFILTRATION; INTRACAUDAL; PERINEURAL
Status: DISCONTINUED | OUTPATIENT
Start: 2019-01-01 | End: 2019-01-01 | Stop reason: HOSPADM

## 2019-01-01 RX ORDER — DOCUSATE SODIUM 100 MG/1
CAPSULE, LIQUID FILLED ORAL
Refills: 1 | COMMUNITY
Start: 2019-01-01 | End: 2019-01-01 | Stop reason: SDUPTHER

## 2019-01-01 RX ORDER — BISACODYL 5 MG
10 TABLET, DELAYED RELEASE (ENTERIC COATED) ORAL ONCE
Status: DISCONTINUED | OUTPATIENT
Start: 2019-01-01 | End: 2019-01-01 | Stop reason: HOSPADM

## 2019-01-01 RX ORDER — HEPARIN 100 UNIT/ML
500 SYRINGE INTRAVENOUS
Status: DISCONTINUED | OUTPATIENT
Start: 2019-01-01 | End: 2019-01-01 | Stop reason: HOSPADM

## 2019-01-01 RX ORDER — AMLODIPINE AND BENAZEPRIL HYDROCHLORIDE 10; 40 MG/1; MG/1
1 CAPSULE ORAL DAILY
Refills: 0 | Status: ON HOLD | COMMUNITY
Start: 2019-01-01 | End: 2019-01-01 | Stop reason: HOSPADM

## 2019-01-01 RX ORDER — BUPIVACAINE HYDROCHLORIDE 2.5 MG/ML
INJECTION, SOLUTION EPIDURAL; INFILTRATION; INTRACAUDAL
Status: DISCONTINUED | OUTPATIENT
Start: 2019-01-01 | End: 2019-01-01 | Stop reason: HOSPADM

## 2019-01-01 RX ORDER — PROCHLORPERAZINE MALEATE 10 MG
10 TABLET ORAL EVERY 6 HOURS PRN
Refills: 6 | COMMUNITY
Start: 2019-01-01

## 2019-01-01 RX ORDER — ONDANSETRON 2 MG/ML
4 INJECTION INTRAMUSCULAR; INTRAVENOUS EVERY 6 HOURS PRN
Status: DISCONTINUED | OUTPATIENT
Start: 2019-01-01 | End: 2019-01-01

## 2019-01-01 RX ORDER — VITAMIN A 3000 MCG
10000 CAPSULE ORAL DAILY
Status: DISCONTINUED | OUTPATIENT
Start: 2019-01-01 | End: 2019-01-01 | Stop reason: HOSPADM

## 2019-01-01 RX ORDER — ENOXAPARIN SODIUM 100 MG/ML
40 INJECTION SUBCUTANEOUS EVERY 24 HOURS
Status: DISCONTINUED | OUTPATIENT
Start: 2019-01-01 | End: 2019-01-01

## 2019-01-01 RX ORDER — ATORVASTATIN CALCIUM 20 MG/1
20 TABLET, FILM COATED ORAL NIGHTLY
Refills: 0 | COMMUNITY
Start: 2019-01-01 | End: 2019-01-01

## 2019-01-01 RX ORDER — CHLORPROMAZINE HYDROCHLORIDE 50 MG/1
TABLET, FILM COATED ORAL
Refills: 0 | COMMUNITY
Start: 2018-01-01 | End: 2019-01-01

## 2019-01-01 RX ORDER — SUCCINYLCHOLINE CHLORIDE 20 MG/ML
INJECTION INTRAMUSCULAR; INTRAVENOUS
Status: DISCONTINUED | OUTPATIENT
Start: 2019-01-01 | End: 2019-01-01

## 2019-01-01 RX ORDER — POLYETHYLENE GLYCOL 3350 17 G/17G
17 POWDER, FOR SOLUTION ORAL DAILY
Status: DISCONTINUED | OUTPATIENT
Start: 2019-01-01 | End: 2019-01-01 | Stop reason: HOSPADM

## 2019-01-01 RX ORDER — SYRING-NEEDL,DISP,INSUL,0.3 ML 29 G X1/2"
148 SYRINGE, EMPTY DISPOSABLE MISCELLANEOUS ONCE
Status: COMPLETED | OUTPATIENT
Start: 2019-01-01 | End: 2019-01-01

## 2019-01-01 RX ORDER — LANOLIN ALCOHOL/MO/W.PET/CERES
100 CREAM (GRAM) TOPICAL DAILY
COMMUNITY
End: 2019-01-01

## 2019-01-01 RX ORDER — MAGNESIUM SULFATE HEPTAHYDRATE 40 MG/ML
2 INJECTION, SOLUTION INTRAVENOUS
Status: COMPLETED | OUTPATIENT
Start: 2019-01-01 | End: 2019-01-01

## 2019-01-01 RX ORDER — LORAZEPAM/0.9% SODIUM CHLORIDE 100MG/0.1L
2 PLASTIC BAG, INJECTION (ML) INTRAVENOUS
Status: CANCELLED | OUTPATIENT
Start: 2019-01-01

## 2019-01-01 RX ORDER — SIMVASTATIN 20 MG/1
20 TABLET, FILM COATED ORAL NIGHTLY
Status: DISCONTINUED | OUTPATIENT
Start: 2019-01-01 | End: 2019-01-01 | Stop reason: HOSPADM

## 2019-01-01 RX ORDER — CHLORPROMAZINE HYDROCHLORIDE 25 MG/1
50 TABLET, FILM COATED ORAL 3 TIMES DAILY PRN
Status: DISCONTINUED | OUTPATIENT
Start: 2019-01-01 | End: 2019-01-01 | Stop reason: HOSPADM

## 2019-01-01 RX ORDER — LISINOPRIL 20 MG/1
20 TABLET ORAL DAILY
Refills: 0 | Status: ON HOLD | COMMUNITY
Start: 2019-01-01 | End: 2019-01-01 | Stop reason: HOSPADM

## 2019-01-01 RX ORDER — PROPOFOL 10 MG/ML
VIAL (ML) INTRAVENOUS CONTINUOUS PRN
Status: DISCONTINUED | OUTPATIENT
Start: 2019-01-01 | End: 2019-01-01

## 2019-01-01 RX ORDER — MULTIVIT WITH MINERALS/HERBS
1 TABLET ORAL DAILY
COMMUNITY
End: 2019-01-01

## 2019-01-01 RX ORDER — TRAMADOL HYDROCHLORIDE 50 MG/1
50 TABLET ORAL EVERY 6 HOURS PRN
Qty: 28 TABLET | Refills: 0 | Status: SHIPPED | OUTPATIENT
Start: 2019-01-01 | End: 2019-01-01

## 2019-01-01 RX ORDER — SIMVASTATIN 20 MG/1
20 TABLET, FILM COATED ORAL NIGHTLY
Refills: 0 | COMMUNITY
Start: 2018-01-01 | End: 2019-01-01

## 2019-01-01 RX ORDER — IBUPROFEN 200 MG
24 TABLET ORAL
Status: DISCONTINUED | OUTPATIENT
Start: 2019-01-01 | End: 2019-01-01

## 2019-01-01 RX ORDER — AMLODIPINE AND BENAZEPRIL HYDROCHLORIDE 10; 40 MG/1; MG/1
1 CAPSULE ORAL DAILY
Refills: 0 | COMMUNITY
Start: 2018-01-01 | End: 2019-01-01

## 2019-01-01 RX ORDER — PANTOPRAZOLE SODIUM 40 MG/1
TABLET, DELAYED RELEASE ORAL
Refills: 0 | COMMUNITY
Start: 2019-01-01

## 2019-01-01 RX ORDER — LORAZEPAM 2 MG/ML
0.5 INJECTION INTRAMUSCULAR ONCE
Status: COMPLETED | OUTPATIENT
Start: 2019-01-01 | End: 2019-01-01

## 2019-01-01 RX ORDER — SODIUM CHLORIDE 0.9 % (FLUSH) 0.9 %
3 SYRINGE (ML) INJECTION EVERY 8 HOURS
Status: DISCONTINUED | OUTPATIENT
Start: 2019-01-01 | End: 2019-01-01 | Stop reason: HOSPADM

## 2019-01-01 RX ORDER — IBUPROFEN 100 MG/5ML
1000 SUSPENSION, ORAL (FINAL DOSE FORM) ORAL DAILY
COMMUNITY
End: 2019-01-01

## 2019-01-01 RX ORDER — CYPROHEPTADINE HYDROCHLORIDE 4 MG/1
TABLET ORAL
Qty: 90 TABLET | Refills: 1 | Status: SHIPPED | OUTPATIENT
Start: 2019-01-01

## 2019-01-01 RX ORDER — VITAMIN A 3000 MCG
10000 CAPSULE ORAL DAILY
COMMUNITY
End: 2019-01-01

## 2019-01-01 RX ORDER — LANOLIN ALCOHOL/MO/W.PET/CERES
400 CREAM (GRAM) TOPICAL 2 TIMES DAILY
COMMUNITY
End: 2019-01-01

## 2019-01-01 RX ORDER — INSULIN ASPART 100 [IU]/ML
1-10 INJECTION, SOLUTION INTRAVENOUS; SUBCUTANEOUS
Status: DISCONTINUED | OUTPATIENT
Start: 2019-01-01 | End: 2019-01-01 | Stop reason: HOSPADM

## 2019-01-01 RX ORDER — DICYCLOMINE HYDROCHLORIDE 10 MG/1
10 CAPSULE ORAL
Qty: 120 CAPSULE | Refills: 0 | Status: SHIPPED | OUTPATIENT
Start: 2019-01-01 | End: 2019-01-01

## 2019-01-01 RX ORDER — ERGOCALCIFEROL 1.25 MG/1
CAPSULE ORAL
Refills: 0 | COMMUNITY
Start: 2019-01-01 | End: 2019-01-01

## 2019-01-01 RX ORDER — ASCORBIC ACID 500 MG
1000 TABLET ORAL DAILY
Status: DISCONTINUED | OUTPATIENT
Start: 2019-01-01 | End: 2019-01-01 | Stop reason: HOSPADM

## 2019-01-01 RX ORDER — IBUPROFEN 200 MG
16 TABLET ORAL
Status: DISCONTINUED | OUTPATIENT
Start: 2019-01-01 | End: 2019-01-01

## 2019-01-01 RX ORDER — POTASSIUM CHLORIDE 750 MG/1
TABLET, EXTENDED RELEASE ORAL
Refills: 0 | COMMUNITY
Start: 2019-01-01 | End: 2019-01-01 | Stop reason: ALTCHOICE

## 2019-01-01 RX ORDER — POLYETHYLENE GLYCOL 3350 17 G/17G
17 POWDER, FOR SOLUTION ORAL 2 TIMES DAILY
Qty: 72 EACH | Refills: 0 | Status: SHIPPED | OUTPATIENT
Start: 2019-01-01

## 2019-01-01 RX ORDER — ALENDRONATE SODIUM 35 MG/1
TABLET ORAL
Refills: 0 | COMMUNITY
Start: 2018-01-01 | End: 2019-01-01

## 2019-01-01 RX ORDER — PROMETHAZINE HYDROCHLORIDE 25 MG/1
25 TABLET ORAL EVERY 6 HOURS PRN
COMMUNITY
End: 2019-01-01

## 2019-01-01 RX ORDER — PNV NO.95/FERROUS FUM/FOLIC AC 28MG-0.8MG
100 TABLET ORAL DAILY
Status: DISCONTINUED | OUTPATIENT
Start: 2019-01-01 | End: 2019-01-01 | Stop reason: HOSPADM

## 2019-01-01 RX ORDER — DOCUSATE SODIUM 100 MG/1
100 CAPSULE, LIQUID FILLED ORAL 2 TIMES DAILY
COMMUNITY

## 2019-01-01 RX ORDER — SODIUM CHLORIDE 9 MG/ML
INJECTION, SOLUTION INTRAVENOUS CONTINUOUS PRN
Status: DISCONTINUED | OUTPATIENT
Start: 2019-01-01 | End: 2019-01-01

## 2019-01-01 RX ORDER — MULTIVIT WITH MINERALS/HERBS
1 TABLET ORAL DAILY
Status: DISCONTINUED | OUTPATIENT
Start: 2019-01-01 | End: 2019-01-01 | Stop reason: CLARIF

## 2019-01-01 RX ORDER — AMLODIPINE AND BENAZEPRIL HYDROCHLORIDE 5; 20 MG/1; MG/1
2 CAPSULE ORAL DAILY
Status: DISCONTINUED | OUTPATIENT
Start: 2019-01-01 | End: 2019-01-01 | Stop reason: HOSPADM

## 2019-01-01 RX ORDER — SODIUM CHLORIDE 0.9 % (FLUSH) 0.9 %
3 SYRINGE (ML) INJECTION
Status: ACTIVE | OUTPATIENT
Start: 2019-01-01

## 2019-01-01 RX ORDER — INSULIN LISPRO 100 [IU]/ML
INJECTION, SOLUTION INTRAVENOUS; SUBCUTANEOUS
Refills: 2 | COMMUNITY
Start: 2019-01-01 | End: 2019-01-01

## 2019-01-01 RX ORDER — CALCIUM CARB/VITAMIN D3/VIT K1 500-100-40
TABLET,CHEWABLE ORAL
COMMUNITY
Start: 2019-01-01 | End: 2019-01-01

## 2019-01-01 RX ORDER — CYPROHEPTADINE HYDROCHLORIDE 4 MG/1
TABLET ORAL
Refills: 0 | Status: ON HOLD | COMMUNITY
Start: 2018-01-01 | End: 2019-01-01 | Stop reason: SDUPTHER

## 2019-01-01 RX ORDER — SODIUM CHLORIDE 9 MG/ML
INJECTION, SOLUTION INTRAVENOUS CONTINUOUS
Status: ACTIVE | OUTPATIENT
Start: 2019-01-01

## 2019-01-01 RX ORDER — HEPARIN SODIUM 1000 [USP'U]/ML
INJECTION, SOLUTION INTRAVENOUS; SUBCUTANEOUS
Status: DISCONTINUED | OUTPATIENT
Start: 2019-01-01 | End: 2019-01-01 | Stop reason: HOSPADM

## 2019-01-01 RX ORDER — HYDROMORPHONE HYDROCHLORIDE 2 MG/ML
0.5 INJECTION, SOLUTION INTRAMUSCULAR; INTRAVENOUS; SUBCUTANEOUS EVERY 5 MIN PRN
Status: DISCONTINUED | OUTPATIENT
Start: 2019-01-01 | End: 2019-01-01 | Stop reason: HOSPADM

## 2019-01-01 RX ORDER — LANCETS 28 GAUGE
EACH MISCELLANEOUS
Refills: 0 | COMMUNITY
Start: 2019-01-01 | End: 2019-01-01

## 2019-01-01 RX ORDER — ONDANSETRON 2 MG/ML
4 INJECTION INTRAMUSCULAR; INTRAVENOUS ONCE AS NEEDED
Status: DISCONTINUED | OUTPATIENT
Start: 2019-01-01 | End: 2019-01-01 | Stop reason: HOSPADM

## 2019-01-01 RX ORDER — ASPIRIN 325 MG
50000 TABLET, DELAYED RELEASE (ENTERIC COATED) ORAL
Refills: 0 | COMMUNITY
Start: 2019-01-01 | End: 2019-01-01

## 2019-01-01 RX ORDER — LIDOCAINE AND PRILOCAINE 25; 25 MG/G; MG/G
CREAM TOPICAL
Refills: 99 | COMMUNITY
Start: 2019-01-01 | End: 2019-01-01

## 2019-01-01 RX ORDER — MEGESTROL ACETATE 40 MG/1
40 TABLET ORAL DAILY
Qty: 30 TABLET | Refills: 11 | Status: SHIPPED | OUTPATIENT
Start: 2019-01-01 | End: 2019-01-01

## 2019-01-01 RX ORDER — METOCLOPRAMIDE HYDROCHLORIDE 5 MG/ML
10 INJECTION INTRAMUSCULAR; INTRAVENOUS EVERY 6 HOURS
Status: DISCONTINUED | OUTPATIENT
Start: 2019-01-01 | End: 2019-01-01

## 2019-01-01 RX ADMIN — ONDANSETRON 4 MG: 2 INJECTION, SOLUTION INTRAMUSCULAR; INTRAVENOUS at 02:03

## 2019-01-01 RX ADMIN — Medication 1 CAPSULE: at 08:03

## 2019-01-01 RX ADMIN — Medication 10000 UNITS: at 08:03

## 2019-01-01 RX ADMIN — METOCLOPRAMIDE 10 MG: 5 INJECTION, SOLUTION INTRAMUSCULAR; INTRAVENOUS at 05:03

## 2019-01-01 RX ADMIN — ALTEPLASE 2 MG: 2.2 INJECTION, POWDER, LYOPHILIZED, FOR SOLUTION INTRAVENOUS at 12:05

## 2019-01-01 RX ADMIN — GEMCITABINE 1520 MG: 38 INJECTION, SOLUTION INTRAVENOUS at 03:04

## 2019-01-01 RX ADMIN — IOHEXOL 75 ML: 350 INJECTION, SOLUTION INTRAVENOUS at 11:03

## 2019-01-01 RX ADMIN — Medication 10 ML: at 12:05

## 2019-01-01 RX ADMIN — PROPOFOL 50 MG: 10 INJECTION, EMULSION INTRAVENOUS at 07:03

## 2019-01-01 RX ADMIN — SIMVASTATIN 20 MG: 20 TABLET, FILM COATED ORAL at 09:03

## 2019-01-01 RX ADMIN — Medication 10 ML: at 03:05

## 2019-01-01 RX ADMIN — Medication 10 ML: at 02:05

## 2019-01-01 RX ADMIN — GRANISETRON HYDROCHLORIDE 1 MG: 1 INJECTION INTRAVENOUS at 03:04

## 2019-01-01 RX ADMIN — GEMCITABINE 1520 MG: 38 INJECTION, SOLUTION INTRAVENOUS at 04:04

## 2019-01-01 RX ADMIN — Medication 10 ML: at 03:04

## 2019-01-01 RX ADMIN — LORAZEPAM 0.5 MG: 2 INJECTION INTRAMUSCULAR; INTRAVENOUS at 12:04

## 2019-01-01 RX ADMIN — RETINOL, ERGOCALCIFEROL, .ALPHA.-TOCOPHEROL ACETATE, DL-, PHYTONADIONE, ASCORBIC ACID, NIACINAMIDE, RIBOFLAVIN 5-PHOSPHATE SODIUM, THIAMINE HYDROCHLORIDE, PYRIDOXINE HYDROCHLORIDE, DEXPANTHENOL, BIOTIN, FOLIC ACID, AND CYANOCOBALAMIN: KIT at 10:03

## 2019-01-01 RX ADMIN — INSULIN ASPART 6 UNITS: 100 INJECTION, SOLUTION INTRAVENOUS; SUBCUTANEOUS at 07:03

## 2019-01-01 RX ADMIN — SODIUM CHLORIDE: 9 INJECTION, SOLUTION INTRAVENOUS at 07:03

## 2019-01-01 RX ADMIN — PACLITAXEL 190 MG: 100 INJECTION, POWDER, LYOPHILIZED, FOR SUSPENSION INTRAVENOUS at 01:05

## 2019-01-01 RX ADMIN — GEMCITABINE 1520 MG: 38 INJECTION, SOLUTION INTRAVENOUS at 02:05

## 2019-01-01 RX ADMIN — INSULIN ASPART 6 UNITS: 100 INJECTION, SOLUTION INTRAVENOUS; SUBCUTANEOUS at 06:03

## 2019-01-01 RX ADMIN — ENOXAPARIN SODIUM 40 MG: 100 INJECTION SUBCUTANEOUS at 05:03

## 2019-01-01 RX ADMIN — GRANISETRON HYDROCHLORIDE 1 MG: 1 INJECTION INTRAVENOUS at 12:05

## 2019-01-01 RX ADMIN — Medication 10 ML: at 11:05

## 2019-01-01 RX ADMIN — INSULIN ASPART 2 UNITS: 100 INJECTION, SOLUTION INTRAVENOUS; SUBCUTANEOUS at 09:03

## 2019-01-01 RX ADMIN — CYPROHEPTADINE HYDROCHLORIDE 4 MG: 4 TABLET ORAL at 08:03

## 2019-01-01 RX ADMIN — PANTOPRAZOLE SODIUM 40 MG: 40 TABLET, DELAYED RELEASE ORAL at 08:03

## 2019-01-01 RX ADMIN — PROPOFOL 85 MCG/KG/MIN: 10 INJECTION, EMULSION INTRAVENOUS at 07:03

## 2019-01-01 RX ADMIN — METOCLOPRAMIDE 10 MG: 5 INJECTION, SOLUTION INTRAMUSCULAR; INTRAVENOUS at 07:03

## 2019-01-01 RX ADMIN — SODIUM CHLORIDE: 9 INJECTION, SOLUTION INTRAVENOUS at 12:05

## 2019-01-01 RX ADMIN — SUCCINYLCHOLINE CHLORIDE 200 MG: 20 INJECTION, SOLUTION INTRAMUSCULAR; INTRAVENOUS at 01:03

## 2019-01-01 RX ADMIN — GRANISETRON HYDROCHLORIDE 1 MG: 1 INJECTION INTRAVENOUS at 12:04

## 2019-01-01 RX ADMIN — METOCLOPRAMIDE 10 MG: 5 INJECTION, SOLUTION INTRAMUSCULAR; INTRAVENOUS at 01:03

## 2019-01-01 RX ADMIN — PROPOFOL 50 MG: 10 INJECTION, EMULSION INTRAVENOUS at 01:03

## 2019-01-01 RX ADMIN — FENTANYL CITRATE 50 MCG: 50 INJECTION, SOLUTION INTRAMUSCULAR; INTRAVENOUS at 01:03

## 2019-01-01 RX ADMIN — SODIUM CHLORIDE: 9 INJECTION, SOLUTION INTRAVENOUS at 01:04

## 2019-01-01 RX ADMIN — I.V. FAT EMULSION 250 ML: 20 EMULSION INTRAVENOUS at 09:03

## 2019-01-01 RX ADMIN — CYPROHEPTADINE HYDROCHLORIDE 4 MG: 4 TABLET ORAL at 02:03

## 2019-01-01 RX ADMIN — ENOXAPARIN SODIUM 40 MG: 100 INJECTION SUBCUTANEOUS at 04:03

## 2019-01-01 RX ADMIN — IRON SUCROSE 100 MG: 20 INJECTION, SOLUTION INTRAVENOUS at 08:03

## 2019-01-01 RX ADMIN — LIDOCAINE HYDROCHLORIDE 50 MG: 20 INJECTION, SOLUTION INTRAVENOUS at 07:03

## 2019-01-01 RX ADMIN — HEPARIN SODIUM (PORCINE) LOCK FLUSH IV SOLN 100 UNIT/ML 500 UNITS: 100 SOLUTION at 01:05

## 2019-01-01 RX ADMIN — Medication 148 ML: at 08:03

## 2019-01-01 RX ADMIN — FENTANYL CITRATE 25 MCG: 50 INJECTION, SOLUTION INTRAMUSCULAR; INTRAVENOUS at 07:03

## 2019-01-01 RX ADMIN — METOCLOPRAMIDE 10 MG: 5 INJECTION, SOLUTION INTRAMUSCULAR; INTRAVENOUS at 11:03

## 2019-01-01 RX ADMIN — ALTEPLASE 2 MG: 2.2 INJECTION, POWDER, LYOPHILIZED, FOR SOLUTION INTRAVENOUS at 01:04

## 2019-01-01 RX ADMIN — PANTOPRAZOLE SODIUM 40 MG: 40 TABLET, DELAYED RELEASE ORAL at 09:03

## 2019-01-01 RX ADMIN — HEPARIN 500 UNITS: 100 SYRINGE at 04:04

## 2019-01-01 RX ADMIN — GEMCITABINE 1520 MG: 38 INJECTION, SOLUTION INTRAVENOUS at 01:04

## 2019-01-01 RX ADMIN — LIDOCAINE HYDROCHLORIDE 100 MG: 20 INJECTION, SOLUTION INTRAVENOUS at 02:02

## 2019-01-01 RX ADMIN — SODIUM CHLORIDE: 9 INJECTION, SOLUTION INTRAVENOUS at 12:04

## 2019-01-01 RX ADMIN — SODIUM CHLORIDE: 9 INJECTION, SOLUTION INTRAVENOUS at 11:05

## 2019-01-01 RX ADMIN — METOCLOPRAMIDE 10 MG: 5 INJECTION, SOLUTION INTRAMUSCULAR; INTRAVENOUS at 12:03

## 2019-01-01 RX ADMIN — SODIUM CHLORIDE: 9 INJECTION, SOLUTION INTRAVENOUS at 01:03

## 2019-01-01 RX ADMIN — INSULIN ASPART 1 UNITS: 100 INJECTION, SOLUTION INTRAVENOUS; SUBCUTANEOUS at 08:03

## 2019-01-01 RX ADMIN — ALTEPLASE 2 MG: 2.2 INJECTION, POWDER, LYOPHILIZED, FOR SOLUTION INTRAVENOUS at 09:05

## 2019-01-01 RX ADMIN — PACLITAXEL 190 MG: 100 INJECTION, POWDER, LYOPHILIZED, FOR SUSPENSION INTRAVENOUS at 02:04

## 2019-01-01 RX ADMIN — LIDOCAINE HYDROCHLORIDE 100 MG: 20 INJECTION, SOLUTION INTRAVENOUS at 01:03

## 2019-01-01 RX ADMIN — PROPOFOL 100 MG: 10 INJECTION, EMULSION INTRAVENOUS at 01:03

## 2019-01-01 RX ADMIN — GEMCITABINE 1520 MG: 38 INJECTION, SOLUTION INTRAVENOUS at 12:05

## 2019-01-01 RX ADMIN — CYPROHEPTADINE HYDROCHLORIDE 4 MG: 4 TABLET ORAL at 09:03

## 2019-01-01 RX ADMIN — ASCORBIC ACID, VITAMIN A PALMITATE, CHOLECALCIFEROL, THIAMINE HYDROCHLORIDE, RIBOFLAVIN-5 PHOSPHATE SODIUM, PYRIDOXINE HYDROCHLORIDE, NIACINAMIDE, DEXPANTHENOL, ALPHA-TOCOPHEROL ACETATE, VITAMIN K1, FOLIC ACID, BIOTIN, CYANOCOBALAMIN: 200; 3300; 200; 6; 3.6; 6; 40; 15; 10; 150; 600; 60; 5 INJECTION, SOLUTION INTRAVENOUS at 06:03

## 2019-01-01 RX ADMIN — SODIUM CHLORIDE: 9 INJECTION, SOLUTION INTRAVENOUS at 03:04

## 2019-01-01 RX ADMIN — I.V. FAT EMULSION 250 ML: 20 EMULSION INTRAVENOUS at 10:03

## 2019-01-01 RX ADMIN — SIMVASTATIN 20 MG: 20 TABLET, FILM COATED ORAL at 08:03

## 2019-01-01 RX ADMIN — PROPOFOL 150 MCG/KG/MIN: 10 INJECTION, EMULSION INTRAVENOUS at 02:02

## 2019-01-01 RX ADMIN — ASCORBIC ACID, VITAMIN A PALMITATE, CHOLECALCIFEROL, THIAMINE HYDROCHLORIDE, RIBOFLAVIN-5 PHOSPHATE SODIUM, PYRIDOXINE HYDROCHLORIDE, NIACINAMIDE, DEXPANTHENOL, ALPHA-TOCOPHEROL ACETATE, VITAMIN K1, FOLIC ACID, BIOTIN, CYANOCOBALAMIN: 200; 3300; 200; 6; 3.6; 6; 40; 15; 10; 150; 600; 60; 5 INJECTION, SOLUTION INTRAVENOUS at 11:03

## 2019-01-01 RX ADMIN — MAGNESIUM SULFATE IN WATER 2 G: 40 INJECTION, SOLUTION INTRAVENOUS at 01:05

## 2019-01-01 RX ADMIN — AMLODIPINE AND BENAZEPRIL HYDROCHLORIDE 2 CAPSULE: 5; 20 CAPSULE ORAL at 08:03

## 2019-01-01 RX ADMIN — Medication 10 ML: at 12:04

## 2019-01-01 RX ADMIN — DEXTROSE 2 G: 50 INJECTION, SOLUTION INTRAVENOUS at 07:03

## 2019-01-01 RX ADMIN — FENTANYL CITRATE 50 MCG: 50 INJECTION, SOLUTION INTRAMUSCULAR; INTRAVENOUS at 02:02

## 2019-01-01 RX ADMIN — PROPOFOL 70 MG: 10 INJECTION, EMULSION INTRAVENOUS at 02:02

## 2019-01-01 RX ADMIN — POTASSIUM CHLORIDE 10 MEQ: 750 TABLET, EXTENDED RELEASE ORAL at 08:03

## 2019-01-01 RX ADMIN — HEPARIN 500 UNITS: 100 SYRINGE at 03:04

## 2019-01-01 RX ADMIN — PACLITAXEL 190 MG: 100 INJECTION, POWDER, LYOPHILIZED, FOR SUSPENSION INTRAVENOUS at 11:05

## 2019-01-01 RX ADMIN — PACLITAXEL 190 MG: 100 INJECTION, POWDER, LYOPHILIZED, FOR SUSPENSION INTRAVENOUS at 01:04

## 2019-01-01 RX ADMIN — INSULIN ASPART 4 UNITS: 100 INJECTION, SOLUTION INTRAVENOUS; SUBCUTANEOUS at 06:03

## 2019-01-01 RX ADMIN — VITAM B12 100 MCG: 100 TAB at 08:03

## 2019-01-01 RX ADMIN — FENTANYL CITRATE 25 MCG: 50 INJECTION, SOLUTION INTRAMUSCULAR; INTRAVENOUS at 08:03

## 2019-01-01 RX ADMIN — SODIUM CHLORIDE 1000 ML: 9 INJECTION, SOLUTION INTRAVENOUS at 01:05

## 2019-01-01 RX ADMIN — OXYCODONE HYDROCHLORIDE AND ACETAMINOPHEN 1000 MG: 500 TABLET ORAL at 08:03

## 2019-01-01 RX ADMIN — INSULIN ASPART 4 UNITS: 100 INJECTION, SOLUTION INTRAVENOUS; SUBCUTANEOUS at 09:03

## 2019-01-01 RX ADMIN — FENTANYL CITRATE 25 MCG: 50 INJECTION, SOLUTION INTRAMUSCULAR; INTRAVENOUS at 02:02

## 2019-01-01 RX ADMIN — AMLODIPINE AND BENAZEPRIL HYDROCHLORIDE 2 CAPSULE: 5; 20 CAPSULE ORAL at 06:03

## 2019-01-01 RX ADMIN — PACLITAXEL 190 MG: 100 INJECTION, POWDER, LYOPHILIZED, FOR SUSPENSION INTRAVENOUS at 03:04

## 2019-01-01 RX ADMIN — METOCLOPRAMIDE 10 MG: 5 INJECTION, SOLUTION INTRAMUSCULAR; INTRAVENOUS at 06:03

## 2019-01-01 RX ADMIN — SODIUM CHLORIDE: 0.9 INJECTION, SOLUTION INTRAVENOUS at 01:02

## 2019-01-01 RX ADMIN — GRANISETRON HYDROCHLORIDE 1 MG: 1 INJECTION INTRAVENOUS at 01:04

## 2019-01-01 RX ADMIN — ONDANSETRON 4 MG: 2 INJECTION, SOLUTION INTRAMUSCULAR; INTRAVENOUS at 08:03

## 2019-01-01 RX ADMIN — GRANISETRON HYDROCHLORIDE 1 MG: 1 INJECTION INTRAVENOUS at 11:05

## 2019-01-01 RX ADMIN — ROCURONIUM BROMIDE 5 MG: 10 INJECTION, SOLUTION INTRAVENOUS at 01:03

## 2019-01-01 RX ADMIN — HEPARIN 500 UNITS: 100 SYRINGE at 02:05

## 2019-02-01 NOTE — TELEPHONE ENCOUNTER
Spoke with Klelen Sanabria/ [facility] [phone]. EUS scheduled for 2/5 at .     Please call him on Monday with instructions.

## 2019-02-04 NOTE — TELEPHONE ENCOUNTER
Upper EUS schld for 02/05/19, arrival time 1200.  Unable to leave message, no one answered phone. Will try again later.

## 2019-02-04 NOTE — TELEPHONE ENCOUNTER
Instructions, time and location confirmed with wife    Your Upper EUS is scheduled for  02/05/2019 at 1200pm      At [facility] which is located at [address] [place] [address].  You will check in at the Hospital Admit Desk located on the 1st floor of the hospital. Please call the the office if you have any questions at [phone]      Upper Endoscopic Ultrasound    Endoscopic ultrasound(EUS) is a procedure used to image the digestive tract, including pancreas, lesions in esophagus and stomach.  It is used to diagnose and stage cancers of the digestive tract.  If necessary, your doctor may need to take samples during the procedure.    A responsible adult (family member or friend) must come with you and transport you home.  You are not allowed to drive, take a taxi or bus or leave the Endoscopy Center alone.  If you do not have a responsible adult with you to take you home, your exam will be cancelled.     If you have questions about the cost of your procedure, you should contact your insurance company as soon as possible.  Please bring a picture ID and your insurance card.  You will sign  treatment authorization forms at check in.  It is necessary for you to sign these forms again even if you recently signed these at the time of your clinic visit.    Please follow instructions of  if you are taking anticoagulant/blood thinning medications such as Aggrenox, aspirin, Brilinta, Effient, Eliquis, Lovenox, Plavix, Pletal, Pradaxa, Ticilid, Xarelto or Coumadin.     Take blood pressure, heart, anti-rejection and or seizure medication at 600 am the morning of the procedure.    Skip your morning dose of insulin or other oral medications for diabetes the morning of the procedure unless instructed otherwise by your doctor.      Day Before The Procedure    Eat a light evening meal and eat no solid food after 7 pm.  You may drink clear liquids including:    Water, Coffee or decaffeinated coffee (no milk  or cream)  Tea, Herbal tea  Carbonated beverages (soft drinks), regular and sugar free  Gelatin  Apple Juice, grape juice, white cranberry juice  Gatorade, Power Aid, Crystal Light, Damir Aid  Lemonade and Limeade  Bouillon, clear consomme'  Snowball, popsicles  DO NOT DRINK ANY LIQUIDS CONTAINING RED DYE  DO NOT DRINK ANY LIQUIDS NOT SPECIFICALLY LISTED  DO NOT DRINK ALCOHOL  NOTHING BY MOUTH AFTER MIDNIGHT    Day of Procedure    600 am take last dose of any medications you are allowed to take with a small amount of clear liquid

## 2019-02-05 PROBLEM — R17 JAUNDICE: Status: ACTIVE | Noted: 2019-01-01

## 2019-02-05 PROBLEM — R17 JAUNDICE: Status: RESOLVED | Noted: 2019-01-01 | Resolved: 2019-01-01

## 2019-02-05 NOTE — H&P
Short Stay Endoscopy History and Physical    PCP - Maximino Alicea MD  Referring Physician - Hebert Watson MD  [address] [facility]  [address]  [place] [id number]    Procedure - eus  ASA - per anesthesia  Mallampati - per anesthesia  History of Anesthesia problems - no  Family history Anesthesia problems -  no   Plan of anesthesia - General    HPI:  This is a 73 y.o. male here for evaluation of: panc cancer    Reflux - no  Dysphagia - no  Abdominal pain - no  Diarrhea - no    ROS:  Constitutional: No fevers, chills, No weight loss  CV: No chest pain  Pulm: No cough, No shortness of breath  Ophtho: No vision changes  GI: see HPI  Derm: No rash    Medical History:  has a past medical history of Abnormal liver function test, Cachexia, Cancer, Chronic bronchitis, Dehydration, Diabetes mellitus, GERD (gastroesophageal reflux disease), Grade I diastolic dysfunction, High cholesterol, Hypertension, Hypomagnesemia, Jaundice, Low blood potassium, LVH (left ventricular hypertrophy), Osteoarthritis, Pancreatic mass, Protein calorie malnutrition, and TIA (transient ischemic attack).    Surgical History:  has a past surgical history that includes Abdominal surgery; Esophagogastroduodenoscopy; and Colonoscopy.    Family History: family history is not on file..    Social History:  reports that  has never smoked. he has never used smokeless tobacco. He reports that he drinks alcohol. He reports that he does not use drugs.    Review of patient's allergies indicates:  No Known Allergies    Medications:   Medications Prior to Admission   Medication Sig Dispense Refill Last Dose    amlodipine-benazepril (LOTREL) 10-40 mg per capsule Take 1 capsule by mouth once daily.  0 2/5/2019 at Unknown time    chlorproMAZINE (THORAZINE) 50 MG tablet TAKE ONE TABLET 3 TIMES DAILY AS NEEDED iccups  0 Past Month at Unknown time     mg capsule TK 1 C PO BID  1 Past Week at Unknown time    ergocalciferol (ERGOCALCIFEROL) 50,000  unit Cap   0 2/4/2019 at Unknown time    FREESTYLE LANCETS 28 gauge lancets TEST UP TO TID  0 2/5/2019 at Unknown time    FREESTYLE LITE METER kit TEST UP TO TID.  0 2/5/2019 at Unknown time    FREESTYLE LITE STRIPS Strp TEST UP TO TID  0 2/5/2019 at Unknown time    HUMALOG U-100 INSULIN 100 unit/mL injection FOLLOW SLIDING SCALE ON SHEET UP TO 36 UNITS IN A DAY  2 2/4/2019 at Unknown time    metFORMIN (GLUCOPHAGE) 500 MG tablet TAKE ONE TABLET 2 TIMES DAILY  0 2/4/2019 at Unknown time    pantoprazole (PROTONIX) 40 MG tablet TK 1 T PO  Q 12 H  0 Past Week at Unknown time    potassium chloride (KLOR-CON) 10 MEQ TbSR TK 1 T PO QD WITH BREAKFAST  0 2/4/2019 at Unknown time    simvastatin (ZOCOR) 20 MG tablet Take 20 mg by mouth nightly.  0 Past Month at Unknown time    alendronate (FOSAMAX) 35 MG tablet TAKE ONE TABLET week  0 2/3/2019    cyproheptadine (PERIACTIN) 4 mg tablet TAKE ONE TABLET 3 TIMES DAILY  0 Unknown at Unknown time    ondansetron (ZOFRAN-ODT) 8 MG TbDL TAKE ONE TABLET 3 TIMES DAILY AS NEEDED  0 More than a month at Unknown time       Physical Exam:    Vital Signs:   Vitals:    02/05/19 1328   BP: 137/80   Pulse: 83   Resp: 18   Temp: 97.9 °F (36.6 °C)       General Appearance: Well appearing in no acute distress    Labs:  No results found for: WBC, HGB, HCT, PLT, CHOL, TRIG, HDL, LDLDIRECT, ALT, AST, NA, K, CL, CREATININE, BUN, CO2, TSH, PSA, INR, GLUF, HGBA1C, MICROALBUR    I have explained the risks and benefits of this endoscopic procedure to the patient including but not limited to bleeding, inflammation, infection, perforation, and death.      Hebert Watson MD

## 2019-02-05 NOTE — PLAN OF CARE
Past Medical History:   Diagnosis Date    Abnormal liver function test     Cachexia     Cancer     Chronic bronchitis     Dehydration     Diabetes mellitus     GERD (gastroesophageal reflux disease)     Grade I diastolic dysfunction     High cholesterol     Hypertension     Hypomagnesemia     Jaundice     Low blood potassium     LVH (left ventricular hypertrophy)     Osteoarthritis     Pancreatic mass     Protein calorie malnutrition     TIA (transient ischemic attack)      Dr. Watson visited at bedside prior to discharge, discussed findings and recommendations with patient and family member; all questions asked and answered. Verbalized understanding of information give. Handout provided at time of discharge.

## 2019-02-05 NOTE — ANESTHESIA PREPROCEDURE EVALUATION
02/05/2019  Chema Lawler is a 73 y.o., male with pancreatic mass for upper EUS under MAC/GA    Past Medical History:   Diagnosis Date    Abnormal liver function test     Cachexia     Cancer     Chronic bronchitis     Dehydration     Diabetes mellitus     GERD (gastroesophageal reflux disease)     Grade I diastolic dysfunction     High cholesterol     Hypertension     Hypomagnesemia     Jaundice     Low blood potassium     LVH (left ventricular hypertrophy)     Osteoarthritis     Pancreatic mass     Protein calorie malnutrition     TIA (transient ischemic attack)          Anesthesia Evaluation    I have reviewed the Patient Summary Reports.    I have reviewed the Nursing Notes.   I have reviewed the Medications.     Review of Systems  Social:  Non-Smoker, Alcohol Use    Hematology/Oncology:         -- Anemia: Current/Recent Cancer. (colon) surgery   Cardiovascular:   Hypertension hyperlipidemia ECG has been reviewed. In media, 11/4/18 SR with occ PAC and nonspecific ST-T wave abn   Pulmonary:   Chronic bronchitis   Hepatic/GI:   Pancreatic mass, obstructive jaundice; elevated liver enzymes   Endocrine:   Diabetes, poorly controlled        Physical Exam  General:  Cachexia    Airway/Jaw/Neck:  Airway Findings: Mouth Opening: Normal Tongue: Normal  Mallampati: II  TM Distance: Normal, at least 6 cm      Dental:  Dental Findings: Periodontal disease, Severe   Chest/Lungs:  Chest/Lungs Clear    Heart/Vascular:  Heart Findings: Normal       Mental Status:  Mental Status Findings:  Cooperative, Alert and Oriented         Anesthesia Plan  Type of Anesthesia, risks & benefits discussed:  Anesthesia Type:  general, MAC  Patient's Preference:   Intra-op Monitoring Plan:   Intra-op Monitoring Plan Comments:   Post Op Pain Control Plan:   Post Op Pain Control Plan Comments:   Induction:    Beta  Blocker:  Patient is not currently on a Beta-Blocker (No further documentation required).       Informed Consent: Patient understands risks and agrees with Anesthesia plan.  Questions answered. Anesthesia consent signed with patient.  ASA Score: 3     Day of Surgery Review of History & Physical:        Anesthesia Plan Notes: 2/1/19 11.9/38.1  Na 134 K 4.3 Cr .42        Ready For Surgery From Anesthesia Perspective.

## 2019-02-05 NOTE — ANESTHESIA POSTPROCEDURE EVALUATION
"Anesthesia Post Evaluation    Patient: Chema Lawler    Procedure(s) Performed: Procedure(s) (LRB):  ULTRASOUND, UPPER GI TRACT, ENDOSCOPIC (N/A)  ERCP (ENDOSCOPIC RETROGRADE CHOLANGIOPANCREATOGRAPHY) (N/A)    Final Anesthesia Type: MAC  Patient location during evaluation: GI PACU  Patient participation: Yes- Able to Participate  Level of consciousness: awake and alert and oriented  Post-procedure vital signs: reviewed and stable  Pain management: adequate  Airway patency: patent  PONV status at discharge: No PONV  Anesthetic complications: no      Cardiovascular status: blood pressure returned to baseline, hemodynamically stable and stable  Respiratory status: unassisted, spontaneous ventilation and room air  Hydration status: euvolemic  Follow-up not needed.        Visit Vitals  /80 (BP Location: Left arm, Patient Position: Lying)   Pulse 83   Temp 36.6 °C (97.9 °F) (Temporal)   Resp 18   Ht 5' 6" (1.676 m)   Wt 56.7 kg (125 lb)   SpO2 100%   BMI 20.18 kg/m²       Pain/Low Score: No Data Recorded      "

## 2019-02-05 NOTE — TRANSFER OF CARE
"Anesthesia Transfer of Care Note    Patient: Chema Lawler    Procedure(s) Performed: Procedure(s) (LRB):  ULTRASOUND, UPPER GI TRACT, ENDOSCOPIC (N/A)  ERCP (ENDOSCOPIC RETROGRADE CHOLANGIOPANCREATOGRAPHY) (N/A)    Patient location: GI    Anesthesia Type: MAC    Transport from OR: Transported from OR on room air with adequate spontaneous ventilation    Post pain: adequate analgesia    Post assessment: no apparent anesthetic complications and tolerated procedure well    Post vital signs: stable    Level of consciousness: awake, alert and oriented    Nausea/Vomiting: no nausea/vomiting    Complications: none    Transfer of care protocol was followed      Last vitals:   Visit Vitals  /80 (BP Location: Left arm, Patient Position: Lying)   Pulse 83   Temp 36.6 °C (97.9 °F) (Temporal)   Resp 18   Ht 5' 6" (1.676 m)   Wt 56.7 kg (125 lb)   SpO2 100%   BMI 20.18 kg/m²     "

## 2019-02-05 NOTE — [employer] PATIENT INSTRUCTIONS
Discharge Summary/Instructions after an Endoscopic Procedure  Patient Name: Chema Lawler  Patient MRN: [id number]  Patient YOB: 1945 Tuesday, February 05, 2019  Hebert Watson MD  RESTRICTIONS:  During your procedure today, you received medications for sedation.  These   medications may affect your judgment, balance and coordination.  Therefore,   for 24 hours, you have the following restrictions:   - DO NOT drive a car, operate machinery, make legal/financial decisions,   sign important papers or drink alcohol.    ACTIVITY:  Today: no heavy lifting, straining or running due to procedural   sedation/anesthesia.  The following day: return to full activity including work.  DIET:  Eat and drink normally unless instructed otherwise.     TREATMENT FOR COMMON SIDE EFFECTS:  - Mild abdominal pain, nausea, belching, bloating or excessive gas:  rest,   eat lightly and use a heating pad.  - Sore Throat: treat with throat lozenges and/or gargle with warm salt   water.  - Because air was used during the procedure, expelling large amounts of air   from your rectum or belching is normal.  - If a bowel prep was taken, you may not have a bowel movement for 1-3 days.    This is normal.  SYMPTOMS TO WATCH FOR AND REPORT TO YOUR PHYSICIAN:  1. Abdominal pain or bloating, other than gas cramps.  2. Chest pain.  3. Back pain.  4. Signs of infection such as: chills or fever occurring within 24 hours   after the procedure.  5. Rectal bleeding, which would show as bright red, maroon, or black stools.   (A tablespoon of blood from the rectum is not serious, especially if   hemorrhoids are present.)  6. Vomiting.  7. Weakness or dizziness.  GO DIRECTLY TO THE NEAREST EMERGENCY ROOM IF YOU HAVE ANY OF THE FOLLOWING:      Difficulty breathing              Chills and/or fever over 101 F   Persistent vomiting and/or vomiting blood   Severe abdominal pain   Severe chest pain   Black, tarry stools   Bleeding- more than one  tablespoon   Any other symptom or condition that you feel may need urgent attention  Your doctor recommends these additional instructions:  If any biopsies were taken, your doctors clinic will contact you in 1 to 2   weeks with any results.  - Discharge patient to home.   - Resume previous diet.   - Continue present medications.   - Await path results.   - Refer to a surgeon at appointment to be scheduled.  For questions, problems or results please call your physician - Hebert Watson MD at Work:  [phone].  EMERGENCY PHONE NUMBER: [phone],  LAB RESULTS: [phone]  IF A COMPLICATION OR EMERGENCY SITUATION ARISES AND YOU ARE UNABLE TO REACH   YOUR PHYSICIAN - GO DIRECTLY TO THE EMERGENCY ROOM.  Hebert Watson MD  2/5/2019 2:57:15 PM  This report has been verified and signed electronically.  [employer]

## 2019-02-07 NOTE — TELEPHONE ENCOUNTER
The FNA of the pancreas revealed pancreatic cancer.  I called and relayed the information.     We'll have him see our surgeons here at [facility] [place] to discuss surgical options.    He would like to seek systemic therapy closer to home is necessary

## 2019-02-11 NOTE — TELEPHONE ENCOUNTER
Please contact patient to schedule an appointment with Dr. Madrid or Dr. Hansen for pancreatic cancer diagnosed from EUS on 02/05/19 per Dr. Watson.

## 2019-02-14 NOTE — TELEPHONE ENCOUNTER
Post procedure instructions. Refer to a surgeon at appointment to be scheduled. Please contact to schedule.

## 2019-02-20 NOTE — TELEPHONE ENCOUNTER
2nd attempt to call pt for surgical consult with Dr. Pereira. Phone rings for several minutes without voicemail option. Will reattempt at a later time.

## 2019-02-27 NOTE — TELEPHONE ENCOUNTER
3rd attempt to make clinic appt with Dr. Pereira. Spoke w/ patients wife, clinic appt scheduled, offered 3/4, selected appt on 3/11.

## 2019-03-11 PROBLEM — C25.0 MALIGNANT NEOPLASM OF HEAD OF PANCREAS: Status: ACTIVE | Noted: 2019-01-01

## 2019-03-11 PROBLEM — K31.1 GASTRIC OUTLET OBSTRUCTION: Status: ACTIVE | Noted: 2019-01-01

## 2019-03-11 NOTE — H&P
[facility] General Surgery  Neuroendocrine Tumor Service  History & Physical    Patient Name: Chema Lawler  MRN: [id number]   Admission Date: 3/11/2019  Code Status: Prior   Attending Provider: Kelli Bennett MD   Primary Care Physician: Maximino Alicea MD  Principal Problem:<principal problem not specified>    Patient information was obtained from patient, past medical records and ER records.     Subjective:     History of Present Illness: 73-year-old male that was referred to Dr. Pereira's office for evaluation for pancreatic mass.  Initially diagnosed in [place] and was referred to Dr. Hebert Watson for EUS, where he was found to have a 47 mm x 44 mm hypoechoic pancreatic head mass with poorly-defined endosonographic borders, and sonographic evidence suggesting invasion into the SMA.  There was ductal dilatation with a maximum duct diameter of 8 mm.  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  By way of symptoms, he has experienced approximately 150 pounds in the past 6 months and complains of abdominal pain with emesis after eating, along with fatigue and diarrhea.  Patient also tells me he had a previous abdominal surgery in 2007 where he had some bowel removed for a cancer, which is likely colon, but I do not have those records at this time.  Of note, he has previously undergone ERCP with stent placement to help alleviate some symptoms.     Chief Complaint/Reason for Admission: Malnutrition/cachexia, need for IV fluids and nutrition    Current Facility-Administered Medications on File Prior to Encounter   Medication    0.9%  NaCl infusion    ez paque ba sulfate 355 mL    sodium chloride 0.9% flush 3 mL     Current Outpatient Medications on File Prior to Encounter   Medication Sig    alendronate (FOSAMAX) 35 MG tablet TAKE ONE TABLET week    amlodipine-benazepril (LOTREL) 10-40 mg per capsule Take 1 capsule by mouth once daily.    ascorbic acid,  "vitamin C, (VITAMIN C) 1000 MG tablet Take 1,000 mg by mouth once daily.    b complex vitamins tablet Take 1 tablet by mouth once daily.    chlorproMAZINE (THORAZINE) 50 MG tablet TAKE ONE TABLET 3 TIMES DAILY AS NEEDED iccups    clopidogrel bisulfate (PLAVIX ORAL) Take 75 mg by mouth once daily.    cyanocobalamin (VITAMIN B-12) 1000 MCG tablet Take 100 mcg by mouth once daily.    cyproheptadine (PERIACTIN) 4 mg tablet TAKE ONE TABLET 3 TIMES DAILY    docusate sodium (COLACE) 100 MG capsule Take 100 mg by mouth 2 (two) times daily.     mg capsule TK 1 C PO BID    ergocalciferol (ERGOCALCIFEROL) 50,000 unit Cap     FREESTYLE LANCETS 28 gauge lancets TEST UP TO TID    FREESTYLE LITE METER kit TEST UP TO TID.    FREESTYLE LITE STRIPS Strp TEST UP TO TID    HUMALOG U-100 INSULIN 100 unit/mL injection FOLLOW SLIDING SCALE ON SHEET UP TO 36 UNITS IN A DAY    insulin syringe-needle U-100 0.3 mL 31 gauge x 5/16" Syrg     magnesium oxide (MAG-OX) 400 mg (241.3 mg magnesium) tablet Take 400 mg by mouth 2 (two) times daily.    metFORMIN (GLUCOPHAGE) 500 MG tablet TAKE ONE TABLET 2 TIMES DAILY    ondansetron (ZOFRAN-ODT) 8 MG TbDL TAKE ONE TABLET 3 TIMES DAILY AS NEEDED    pantoprazole (PROTONIX) 40 MG tablet TK 1 T PO  Q 12 H    potassium chloride (KLOR-CON) 10 MEQ TbSR TK 1 T PO QD WITH BREAKFAST    promethazine (PHENERGAN) 25 MG tablet Take 25 mg by mouth every 6 (six) hours as needed for Nausea.    simvastatin (ZOCOR) 20 MG tablet Take 20 mg by mouth nightly.    SITagliptin (JANUVIA) 100 MG Tab Take 100 mg by mouth once daily.    vitamin A 08098 UNIT capsule Take 10,000 Units by mouth once daily.     Review of patient's allergies indicates:  No Known Allergies    Past Medical History:   Diagnosis Date    Abnormal liver function test     Cachexia     Cancer     Chronic bronchitis     Dehydration     Diabetes mellitus     GERD (gastroesophageal reflux disease)     Grade I diastolic " dysfunction     High cholesterol     Hypertension     Hypomagnesemia     Jaundice     Low blood potassium     LVH (left ventricular hypertrophy)     Osteoarthritis     Pancreatic mass     Protein calorie malnutrition     TIA (transient ischemic attack)      Past Surgical History:   Procedure Laterality Date    ABDOMINAL SURGERY      COLONOSCOPY      ERCP (ENDOSCOPIC RETROGRADE CHOLANGIOPANCREATOGRAPHY) N/A 2/5/2019    Performed by Hebert Watson MD at [facility]    ESOPHAGOGASTRODUODENOSCOPY      ULTRASOUND, UPPER GI TRACT, ENDOSCOPIC N/A 2/5/2019    Performed by Hebert aWtson MD at [facility]     Family History     None        Tobacco Use    Smoking status: Never Smoker    Smokeless tobacco: Never Used   Substance and Sexual Activity    Alcohol use: Yes     Comment: occassional    Drug use: No    Sexual activity: Not Currently     Partners: Female     Review of Systems   Constitutional: Positive for activity change, appetite change, fatigue and unexpected weight change. Negative for chills, diaphoresis and fever.   HENT: Positive for trouble swallowing. Negative for congestion, dental problem, drooling, ear discharge, ear pain, facial swelling, mouth sores, nosebleeds, postnasal drip, sinus pressure, sinus pain, sneezing, sore throat and voice change.    Eyes: Negative for pain, redness, itching and visual disturbance.   Respiratory: Negative for apnea, choking, chest tightness, wheezing and stridor.    Cardiovascular: Negative for chest pain, palpitations and leg swelling.   Gastrointestinal: Positive for abdominal distention, abdominal pain, diarrhea, nausea and vomiting. Negative for blood in stool, constipation and rectal pain.   Endocrine: Negative.    Genitourinary: Negative.    Musculoskeletal: Negative.    Skin: Negative for color change, rash and wound.   Allergic/Immunologic: Negative.    Neurological: Negative for dizziness, tremors, syncope, facial asymmetry, weakness and headaches.    Psychiatric/Behavioral: Negative.      Objective:     Vital Signs (Most Recent):  Temp: 97.9 °F (36.6 °C) (03/11/19 1538)  Pulse: 97 (03/11/19 1538)  Resp: 20 (03/11/19 1538)  BP: 116/83 (03/11/19 1538) Vital Signs (24h Range):  Temp:  [97.8 °F (36.6 °C)-97.9 °F (36.6 °C)] 97.9 °F (36.6 °C)  Pulse:  [] 97  Resp:  [20] 20  BP: (108-116)/(80-83) 116/83     Weight: 51.6 kg (113 lb 12.1 oz)  Body mass index is 18.36 kg/m².         Physical Exam   Constitutional: He is oriented to person, place, and time. Vital signs are normal. He appears cachectic. No distress.   HENT:   Head: Normocephalic and atraumatic.   Eyes: EOM are normal.   Neck: Normal range of motion. Neck supple. No tracheal deviation present.   Cardiovascular: Regular rhythm.   Pulmonary/Chest: No respiratory distress.   Abdominal: Soft. Bowel sounds are normal. He exhibits no distension. There is no tenderness. There is no rebound and no guarding.   Well healed midline scar.   Musculoskeletal: Normal range of motion.   Neurological: He is alert and oriented to person, place, and time.   Skin: Skin is warm and dry.   Psychiatric: He has a normal mood and affect.   Vitals reviewed.    Significant Labs:  CBC:   Recent Labs   Lab 03/11/19  1606   WBC 9.01   RBC 4.62   HGB 13.7*   HCT 42.4      MCV 92   MCH 29.7   MCHC 32.3     CMP: pending    Coagulation:  Recent Labs   Lab 03/11/19  1606   LABPROT 11.5   INR 1.1     Preallbumin:   Recent Labs   Lab 03/11/19  1606   PREALBUMIN 18*     Significant Diagnostics:  I have reviewed all pertinent imaging results/findings within the past 24 hours.    Assessment/Plan:     73-year-old gentleman with pancreatic head adenocarcinoma, likely unresectable by imaging, admitted with malnutrition, possible gastric outlet obstruction    Admit to surgery  TPN, IVF  CMP, prealbumin, CA-19-9  CT abd/pelvis  GI and oncology consults    Angus Phillips MD  General Surgery  Neuroendocrine Tumor Service  [facility]

## 2019-03-11 NOTE — HPI
"73 year-old male with recently diagnosed pancreatic cancer (T4 lesion by endoscopic ultrasound) was admitted for evaluation for gastrointestinal obstruction. Consult is for "pt with pancreatic cancer, for neoadjuvant [tx]."  "

## 2019-03-11 NOTE — ASSESSMENT & PLAN NOTE
"- per endoscopic ultrasound report dated 2/5/19, he has a "47 mm by 44 mm" mass in the pancreatic head. "There was sonographic evidence suggesting invasion into the superior mesenteric artery (manifested by abutment)."  - follow up CT imaging today and CA 19-9 testing.   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  - I am concerned that he will have unresectable disease by imaging.  - depending on results of this hospitalization, including imaging, he may be a candidate for neoadjuvant chemotherapy (if extent of involvement of the artery is not extensive). I am concerned about his nutrition and significant weight loss.  - I will schedule a follow-up appointment for him with me next week to discuss results of workup and finalize therapeutic plan.  - he is agreeable with this plan.   "

## 2019-03-11 NOTE — SUBJECTIVE & OBJECTIVE
- patient has lost about 150 lbs in the past 6 months. He complains of abdominal pain, vomiting after eating, fatigue, and diarrhea. He denies shortness of breath, chest pain, constipation.    Oncology Treatment Plan:   [No treatment plan]    Medications:  Continuous Infusions:   Amino acid 2.75% - dextrose 10% (CLINIMIX-E) solution with additives ( 1L provides 27.5 gm AA, 100 gm CHO (340 kcal/L dextrose), Na 35, K 30, Mg 5, Ca 4.5, Acetate 51, Cl 39, Phos 15)       Scheduled Meds:   enoxaparin  40 mg Subcutaneous Daily    fat emulsion 20%  250 mL Intravenous Daily    [START ON 3/12/2019] iron sucrose (VENOFER) IVPB  100 mg Intravenous Daily    metoclopramide HCl  10 mg Intravenous Q6H     PRN Meds:ondansetron     Review of patient's allergies indicates:  No Known Allergies     Past Medical History:   Diagnosis Date    Abnormal liver function test     Cachexia     Cancer     Chronic bronchitis     Dehydration     Diabetes mellitus     GERD (gastroesophageal reflux disease)     Grade I diastolic dysfunction     High cholesterol     Hypertension     Hypomagnesemia     Jaundice     Low blood potassium     LVH (left ventricular hypertrophy)     Osteoarthritis     Pancreatic mass     Protein calorie malnutrition     TIA (transient ischemic attack)      Past Surgical History:   Procedure Laterality Date    ABDOMINAL SURGERY      COLONOSCOPY      ERCP (ENDOSCOPIC RETROGRADE CHOLANGIOPANCREATOGRAPHY) N/A 2/5/2019    Performed [facility] Hebert [facility]O    ESOPHAGOGASTRODUODENOSCOPY      ULTRASOUND, UPPER GI TRACT, ENDOSCOPIC N/A 2/5/2019    [facility]     Family History     None        Tobacco Use    Smoking status: Never Smoker    Smokeless tobacco: Never Used   Substance and Sexual Activity    Alcohol use: Yes     Comment: occassional    Drug use: No    Sexual activity: Not Currently     Partners: Female       Review of Systems   Constitutional:  Positive for appetite change, fatigue and unexpected weight change.   HENT: Negative for sore throat.    Eyes: Negative for visual disturbance.   Respiratory: Negative for cough.    Cardiovascular: Negative for chest pain.   Gastrointestinal: Positive for abdominal pain, diarrhea and vomiting.   Genitourinary: Negative for dysuria.   Musculoskeletal: Negative for back pain.   Skin: Negative for rash.   Neurological: Negative for headaches.   Hematological: Negative for adenopathy.   Psychiatric/Behavioral: The patient is not nervous/anxious.      Objective:     Vital Signs (Most Recent):  Temp: 97.9 °F (36.6 °C) (03/11/19 1538)  Pulse: 97 (03/11/19 1538)  Resp: 20 (03/11/19 1538)  BP: 116/83 (03/11/19 1538) Vital Signs (24h Range):  Temp:  [97.8 °F (36.6 °C)-97.9 °F (36.6 °C)] 97.9 °F (36.6 °C)  Pulse:  [] 97  Resp:  [20] 20  BP: (108-116)/(80-83) 116/83     Weight: 51.6 kg (113 lb 12.1 oz)  Body mass index is 18.36 kg/m².  Body surface area is 1.55 meters squared.    No intake or output data in the 24 hours ending 03/11/19 1646    Physical Exam   Constitutional: He is oriented to person, place, and time. He appears well-developed.   Malnourished, fatigued.   HENT:   Head: Normocephalic.   Temporal wasting   Eyes: EOM are normal. Pupils are equal, round, and reactive to light.   Neck: Normal range of motion.   Cardiovascular: Regular rhythm.   tachycardic   Pulmonary/Chest: Effort normal.   Abdominal: Soft. There is tenderness (to deep palpation).   Musculoskeletal: Normal range of motion.   Neurological: He is alert and oriented to person, place, and time.   Skin: Skin is warm and dry.   Psychiatric: He has a normal mood and affect. His behavior is normal. Judgment and thought content normal.   Nursing note and vitals reviewed.    Significant Labs:   Labs have been reviewed.    Lab Results   Component Value Date    WBC 9.01 03/11/2019    HGB 13.7 (L) 03/11/2019    HCT 42.4 03/11/2019    MCV 92 03/11/2019      03/11/2019

## 2019-03-11 NOTE — PROGRESS NOTES
House Supervisor, Heidi notified of direct admit -- no beds currently available, pt will have swallow study and await admission.

## 2019-03-11 NOTE — NURSING
Patient is a direct admit from Dr. Pereira's Office.  Patient is awake, alert, and oriented.  Patient ambulates in room.  Instructed patient to call for any questions or assistance.  Patient verbalized understanding.  Safety is maintained with bed low, wheels locked, and side rails up.  Call light within reach.  Bed alarm on.  Admit assessment completed.  Plan of care reviewed.  Will continue to monitor.

## 2019-03-11 NOTE — CONSULTS
"[facility]  Hematology/Oncology  Consult Note    Patient Name: Chema Lawler  MRN: [id number]  Admission Date: 3/11/2019  Hospital Length of Stay: 0 days  Code Status: Prior   Attending Provider: Kelli Bennett MD  Consulting Provider: Tate Arteaga MD  Primary Care Physician: Maximino Alicea MD  Principal Problem:<principal problem not specified>    Inpatient consult to Hematology/Oncology  Consult performed by: Tate Arteaga MD  Consult ordered by: Kelli Bennett MD  Reason for consult: pancreatic cancer        Subjective:     HPI:  73 year-old male with recently diagnosed pancreatic cancer (T4 lesion by endoscopic ultrasound) was admitted for evaluation for gastrointestinal obstruction. Consult is for "pt with pancreatic cancer, for neoadjuvant [tx]."    - patient has lost about 150 lbs in the past 6 months. He complains of abdominal pain, vomiting after eating, fatigue, and diarrhea. He denies shortness of breath, chest pain, constipation.    Oncology Treatment Plan:   [No treatment plan]    Medications:  Continuous Infusions:   Amino acid 2.75% - dextrose 10% (CLINIMIX-E) solution with additives ( 1L provides 27.5 gm AA, 100 gm CHO (340 kcal/L dextrose), Na 35, K 30, Mg 5, Ca 4.5, Acetate 51, Cl 39, Phos 15)       Scheduled Meds:   enoxaparin  40 mg Subcutaneous Daily    fat emulsion 20%  250 mL Intravenous Daily    [START ON 3/12/2019] iron sucrose (VENOFER) IVPB  100 mg Intravenous Daily    metoclopramide HCl  10 mg Intravenous Q6H     PRN Meds:ondansetron     Review of patient's allergies indicates:  No Known Allergies     Past Medical History:   Diagnosis Date    Abnormal liver function test     Cachexia     Cancer     Chronic bronchitis     Dehydration     Diabetes mellitus     GERD (gastroesophageal reflux disease)     Grade I diastolic dysfunction     High cholesterol     Hypertension     Hypomagnesemia     Jaundice     Low blood potassium  "    LVH (left ventricular hypertrophy)     Osteoarthritis     Pancreatic mass     Protein calorie malnutrition     TIA (transient ischemic attack)      Past Surgical History:   Procedure Laterality Date    ABDOMINAL SURGERY      COLONOSCOPY      ERCP (ENDOSCOPIC RETROGRADE CHOLANGIOPANCREATOGRAPHY) N/A 2/5/2019    [facility]    ESOPHAGOGASTRODUODENOSCOPY      ULTRASOUND, UPPER GI TRACT, ENDOSCOPIC N/A 2/5/2019    [facility]     Family History     None        Tobacco Use    Smoking status: Never Smoker    Smokeless tobacco: Never Used   Substance and Sexual Activity    Alcohol use: Yes     Comment: occassional    Drug use: No    Sexual activity: Not Currently     Partners: Female       Review of Systems   Constitutional: Positive for appetite change, fatigue and unexpected weight change.   HENT: Negative for sore throat.    Eyes: Negative for visual disturbance.   Respiratory: Negative for cough.    Cardiovascular: Negative for chest pain.   Gastrointestinal: Positive for abdominal pain, diarrhea and vomiting.   Genitourinary: Negative for dysuria.   Musculoskeletal: Negative for back pain.   Skin: Negative for rash.   Neurological: Negative for headaches.   Hematological: Negative for adenopathy.   Psychiatric/Behavioral: The patient is not nervous/anxious.      Objective:     Vital Signs (Most Recent):  Temp: 97.9 °F (36.6 °C) (03/11/19 1538)  Pulse: 97 (03/11/19 1538)  Resp: 20 (03/11/19 1538)  BP: 116/83 (03/11/19 1538) Vital Signs (24h Range):  Temp:  [97.8 °F (36.6 °C)-97.9 °F (36.6 °C)] 97.9 °F (36.6 °C)  Pulse:  [] 97  Resp:  [20] 20  BP: (108-116)/(80-83) 116/83     Weight: 51.6 kg (113 lb 12.1 oz)  Body mass index is 18.36 kg/m².  Body surface area is 1.55 meters squared.    No intake or output data in the 24 hours ending 03/11/19 1646    Physical Exam   Constitutional: He is oriented to person, place, and time. He appears  "well-developed.   Malnourished, fatigued.   HENT:   Head: Normocephalic.   Temporal wasting   Eyes: EOM are normal. Pupils are equal, round, and reactive to light.   Neck: Normal range of motion.   Cardiovascular: Regular rhythm.   tachycardic   Pulmonary/Chest: Effort normal.   Abdominal: Soft. There is tenderness (to deep palpation).   Musculoskeletal: Normal range of motion.   Neurological: He is alert and oriented to person, place, and time.   Skin: Skin is warm and dry.   Psychiatric: He has a normal mood and affect. His behavior is normal. Judgment and thought content normal.   Nursing note and vitals reviewed.    Significant Labs:   Labs have been reviewed.    Lab Results   Component Value Date    WBC 9.01 03/11/2019    HGB 13.7 (L) 03/11/2019    HCT 42.4 03/11/2019    MCV 92 03/11/2019     03/11/2019         Assessment/Plan:     Malignant neoplasm of head of pancreas    - per endoscopic ultrasound report dated 2/5/19, he has a "47 mm by 44 mm" mass in the pancreatic head. "There was sonographic evidence suggesting invasion into the superior mesenteric artery (manifested by abutment)."  - follow up CT imaging today and CA 19-9 testing.   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  - I am concerned that he will have unresectable disease by imaging.  - depending on results of this hospitalization, including imaging, he may be a candidate for neoadjuvant chemotherapy (if extent of involvement of the artery is not extensive). I am concerned about his nutrition and significant weight loss.  - I will schedule a follow-up appointment for him with me next week to discuss results of workup and finalize therapeutic plan.  - he is agreeable with this plan.          Thank you for your consult.     Tate Arteaga MD  Hematology/Oncology  [facility]  "

## 2019-03-11 NOTE — PROGRESS NOTES
"[place]AN[place]:  [facility]  A collaboration between [facility] and [facility]      PATIENT: Chema Lawler  MRN: [id number]  DATE: 3/11/2019    Subjective:      Chief Complaint: Consult (consult for pancreatic cancer/ref by Dr Watson)  has loss of weight, appetite since september 2018  Losing weight    Recently had EUS and biopsy positive pancreatic cancer    throwing food after eating with diarrhea    Vitals:   Vitals:    03/11/19 0840   BP: 108/80   Pulse: 108   Temp: 97.8 °F (36.6 °C)   TempSrc: Oral   Weight: 54.1 kg (119 lb 4.3 oz)   Height: 5' 6" (1.676 m)        Karnofsky Score:     Diagnosis: No diagnosis found.     Oncologic History:     Interval History:     Past Medical History:  Past Medical History:   Diagnosis Date    Abnormal liver function test     Cachexia     Cancer     Chronic bronchitis     Dehydration     Diabetes mellitus     GERD (gastroesophageal reflux disease)     Grade I diastolic dysfunction     High cholesterol     Hypertension     Hypomagnesemia     Jaundice     Low blood potassium     LVH (left ventricular hypertrophy)     Osteoarthritis     Pancreatic mass     Protein calorie malnutrition     TIA (transient ischemic attack)        Past Surgical History:  Past Surgical History:   Procedure Laterality Date    ABDOMINAL SURGERY      COLONOSCOPY      ERCP (ENDOSCOPIC RETROGRADE CHOLANGIOPANCREATOGRAPHY) N/A 2/5/2019    Performed by Hebert Watson MD at [facility]    ESOPHAGOGASTRODUODENOSCOPY      ULTRASOUND, UPPER GI TRACT, ENDOSCOPIC N/A 2/5/2019    Performed by Hebert Waston MD at [facility]       Family History:  History reviewed. No pertinent family history.    Allergies:  Review of patient's allergies indicates:  No Known Allergies    Medications:  Current Outpatient Medications   Medication Sig Dispense Refill    ascorbic acid, vitamin C, (VITAMIN C) 1000 MG tablet Take " "1,000 mg by mouth once daily.      b complex vitamins tablet Take 1 tablet by mouth once daily.      clopidogrel bisulfate (PLAVIX ORAL) Take 75 mg by mouth once daily.      cyanocobalamin (VITAMIN B-12) 1000 MCG tablet Take 100 mcg by mouth once daily.      docusate sodium (COLACE) 100 MG capsule Take 100 mg by mouth 2 (two) times daily.      FREESTYLE LANCETS 28 gauge lancets TEST UP TO TID  0    FREESTYLE LITE METER kit TEST UP TO TID.  0    FREESTYLE LITE STRIPS Strp TEST UP TO TID  0    HUMALOG U-100 INSULIN 100 unit/mL injection FOLLOW SLIDING SCALE ON SHEET UP TO 36 UNITS IN A DAY  2    magnesium oxide (MAG-OX) 400 mg (241.3 mg magnesium) tablet Take 400 mg by mouth 2 (two) times daily.      metFORMIN (GLUCOPHAGE) 500 MG tablet TAKE ONE TABLET 2 TIMES DAILY  0    ondansetron (ZOFRAN-ODT) 8 MG TbDL TAKE ONE TABLET 3 TIMES DAILY AS NEEDED  0    pantoprazole (PROTONIX) 40 MG tablet TK 1 T PO  Q 12 H  0    potassium chloride (KLOR-CON) 10 MEQ TbSR TK 1 T PO QD WITH BREAKFAST  0    promethazine (PHENERGAN) 25 MG tablet Take 25 mg by mouth every 6 (six) hours as needed for Nausea.      simvastatin (ZOCOR) 20 MG tablet Take 20 mg by mouth nightly.  0    SITagliptin (JANUVIA) 100 MG Tab Take 100 mg by mouth once daily.      vitamin A 66952 UNIT capsule Take 10,000 Units by mouth once daily.      alendronate (FOSAMAX) 35 MG tablet TAKE ONE TABLET week  0    amlodipine-benazepril (LOTREL) 10-40 mg per capsule Take 1 capsule by mouth once daily.  0    chlorproMAZINE (THORAZINE) 50 MG tablet TAKE ONE TABLET 3 TIMES DAILY AS NEEDED iccups  0    cyproheptadine (PERIACTIN) 4 mg tablet TAKE ONE TABLET 3 TIMES DAILY  0     mg capsule TK 1 C PO BID  1    ergocalciferol (ERGOCALCIFEROL) 50,000 unit Cap   0    insulin syringe-needle U-100 0.3 mL 31 gauge x 5/16" Syrg        No current facility-administered medications for this visit.      Facility-Administered Medications Ordered in Other Visits "   Medication Dose Route Frequency Provider Last Rate Last Dose    0.9%  NaCl infusion   Intravenous Continuous Hebert Watson MD 20 mL/hr at 02/05/19 1339      sodium chloride 0.9% flush 3 mL  3 mL Intravenous PRN Hebert Watson MD           Review of Systems   Constitutional: Positive for activity change, appetite change, fatigue and unexpected weight change. Negative for chills, diaphoresis and fever.   HENT: Positive for trouble swallowing. Negative for congestion, dental problem, drooling, ear discharge, ear pain, facial swelling, mouth sores, nosebleeds, postnasal drip, sinus pressure, sinus pain, sneezing, sore throat and voice change.    Eyes: Negative for pain, redness, itching and visual disturbance.   Respiratory: Negative for apnea, choking, chest tightness, wheezing and stridor.    Cardiovascular: Negative for chest pain, palpitations and leg swelling.   Gastrointestinal: Positive for abdominal distention, abdominal pain, diarrhea, nausea and vomiting. Negative for blood in stool, constipation and rectal pain.   Endocrine: Negative.    Genitourinary: Negative.    Musculoskeletal: Negative.  Negative for myalgias, neck pain and neck stiffness.   Skin: Negative for color change, pallor and rash.   Allergic/Immunologic: Negative.    Neurological: Negative for dizziness, tremors, syncope, facial asymmetry, weakness and headaches.   Psychiatric/Behavioral: Negative.  Negative for behavioral problems, confusion, decreased concentration and dysphoric mood.      Objective:      Physical Exam   Constitutional: He is oriented to person, place, and time.   Emaciated  Generalized muscle loss   HENT:   Head: Normocephalic and atraumatic.   Right Ear: External ear normal.   Left Ear: External ear normal.   Eyes: Pupils are equal, round, and reactive to light.   Neck: Normal range of motion. No tracheal deviation present. No thyromegaly present.   Cardiovascular: Normal rate and regular rhythm.   Pulmonary/Chest:  Effort normal and breath sounds normal.   Abdominal: Soft. Bowel sounds are normal. He exhibits no distension and no mass. There is no tenderness. There is no rebound. No hernia.   Midline scar   Musculoskeletal: Normal range of motion.   Muscle wasting   Lymphadenopathy:     He has no cervical adenopathy.   Neurological: He is alert and oriented to person, place, and time.   Skin: Skin is warm.   Psychiatric: He has a normal mood and affect. His behavior is normal.      Assessment:       No diagnosis found.    Laboratory Data:   Performed by: [facility] [place]  Patient Name: Chema Lawler  Procedure Date: 2/5/2019 2:15 PM  MRN: [id number]  Account Number: [id number]  YOB: 1945  Age: 73  Room: Room 3  Gender: Male  Attending MD: Hebert Watson MD  Procedure:            Upper EUS  Indications:          Suspected solid pancreatic neoplasm  Providers:            Hebert Watson MD, Rama Paredes RN, Coty Fong LPN, Wilfred Gillespie, Technician  Referring MD:         Hebert Watson MD (Referring MD)  Complications:        No immediate complications.  Medicines:            Monitored Anesthesia Care  Procedure:            Pre-Anesthesia Assessment:                        - Prior to the procedure, a History and Physical                         was performed, and patient medications and                         allergies were reviewed. The patient is competent.                         The risks and benefits of the procedure and the                         sedation options and risks were discussed with the                         patient. All questions were answered and informed                         consent was obtained. Patient identification and                         proposed procedure were verified by the physician                         in the pre-procedure area. Mental Status                         Examination: alert and oriented. Airway                          Examination: normal oropharyngeal airway and neck                         mobility. Respiratory Examination: clear to                         auscultation. CV Examination: normal. ASA Grade                         Assessment: III - A patient with severe systemic                         disease. After reviewing the risks and benefits,                         the patient was deemed in satisfactory condition to                         undergo the procedure. The anesthesia plan was to                         use monitored anesthesia care (MAC). Immediately                         prior to administration of medications, the patient                         was re-assessed for adequacy to receive sedatives.                         The heart rate, respiratory rate, oxygen                         saturations, blood pressure, adequacy of pulmonary                         ventilation, and response to care were monitored                         throughout the procedure. The physical status of                         the patient was re-assessed after the procedure.                        After obtaining informed consent, the endoscope was                         passed under direct vision. Throughout the                         procedure, the patient's blood pressure, pulse, and                         oxygen saturations were monitored continuously. The                         Olympus scope [id number] ([id number]) was introduced                         through the mouth, and advanced to the second part                         of duodenum. The upper EUS was accomplished without                         difficulty. The patient tolerated the procedure                         well.  Findings:       ENDOSCOPIC FINDING: :       The examined esophagus was endoscopically normal.       The entire examined stomach was endoscopically normal.       A previously placed biliary stent was seen in the ampulla.       ENDOSONOGRAPHIC FINDING: :        An oval mass was identified in the pancreatic head. The mass was        hypoechoic. The mass measured 47 mm by 44 mm in maximal        cross-sectional diameter. The endosonographic borders were        poorly-defined. There was sonographic evidence suggesting invasion        into the superior mesenteric artery (manifested by abutment). The        remainder of the pancreas was examined. The endosonographic        appearance of parenchyma and the upstream pancreatic duct indicated        duct dilation and a maximum duct diameter of 8 mm. Fine needle        aspiration for cytology was performed. Color Doppler imaging was        utilized prior to needle puncture to confirm a lack of significant        vascular structures within the needle path. Three passes were made        with the 25 gauge needle using a transduodenal approach. A stylet        was used. A cytologist was present and performed a preliminary        cytologic examination. The cellularity of the specimen was adequate.        Final cytology results are pending.       One stent was visualized endosonographically in the common bile        duct. Extension of the stent was noted in the main bile duct.  Impression:           - Normal esophagus.                        - Normal stomach.                        - Biliary stent in the duodenum.                        - A mass was identified in the pancreatic head.                         This was staged T4 N0 Mx by endosonographic                         criteria. The staging applies if malignancy is                         confirmed. Fine needle aspiration performed.                        - One stent was visualized endosonographically in                         the common bile duct.  Recommendation:       - Discharge patient to home.                        - Resume previous diet.                        - Continue present medications.                        - Await path results.                        - Refer to a  surgeon at appointment to be scheduled.  Attending Participation:       I personally performed the entire procedure.  Hebert Watson MD         Impression:  73-year-old gentleman with onset of cachexia loss of it loss of appetite back pain and vomiting of food since September getting worse underwent endoscopic ultrasound.  He had a mass that was biopsied from the head of pancreas confirming pancreatic cancer.  He also had underwent ERCP and stent placement for after this  .  He has come to the office today with no previous charts available.  I he states he has had a surgery for some cancer likely colon in 2007 following which he has had a chemotherapy.  He and his family with saying that he had pancreatic cancer in 2007 which is unlikely.    His main complaint was excessive weight loss the last few months.  He is not able to eat are tolerate anything by mouth sometime after eating he throws up therefore he stays on bed all the time and does not involve in any activities.  He was accompanied by his many family members.  They live in [place]    And concern by his appearance he has had a tremendous weight loss with nausea vomiting.  He needs to be admitted for IV fluids and nutrition.  He has come here for further management for pancreatic cancer.  I reviewed the endoscopic report which shows the tumor very  close to superior mesenteric artery and more than 3.5 cm.  He is not a candidate for surgery he needs neoadjuvant therapy.  According to the family he has had a CT scan before and I do not have the results.  Will I will admit him and evaluate him.  Will consult GI and Oncology start him on TPN during this time.    Had a long discussion with the family I told him the importance of getting admitted because of his gastric outlet obstruction like symptoms.  Plan:       Admission today  Labs and the CT scan  IR for esophageal swallow to rule out any obstruction however the study was not done to evaluate the small bowel  therefore no evidence of obstruction until the level of stomach seen  Will consult GI and Oncology                    DENISHA Pereira MD, FACS   Associate Professor of Surgery, [facility]   Neuroendocrine Surgery, Hepatic/Pancreatic & General Surgery   [address], [address]   [place] [address]   ph. [phone]; [phone]   fax. [phone]

## 2019-03-11 NOTE — PATIENT INSTRUCTIONS
Proceed to 1st Floor Outpatient diagnostic center for swallow study, then admission desk to register for hospital stay

## 2019-03-11 NOTE — LETTER
March 11, 2019      [facility] [address] [place] [address]  Phone: [phone]  Fax: [phone]       Patient: Chema Lawler   YOB: 1945  Date of Visit: 03/11/2019    To Whom It May Concern:    Janey Lawler was at [facility] on 03/11/2019 caring for her family member. Mr. Lawler hospitalization length is to be determined, likely several days. If you have any questions or concerns, or if I can be of further assistance, please do not hesitate to contact me.    Sincerely,      Glendy Wilde LPN for   Dr. Kelli Bennett

## 2019-03-12 PROBLEM — E43 SEVERE MALNUTRITION: Status: ACTIVE | Noted: 2019-01-01

## 2019-03-12 NOTE — PLAN OF CARE
This  put name on white board and explained blue discharge folder to patient. Discharge planning brochure and/or business card given to patient.  Patient verbalized understanding.    TN met with patient. Brother and sister-in-law in room visiting. Pt states prior to arrival he was living at home with his wife, daughter(adult), and 3 year old grandson. Pt has used [facility] in the past (3 years ago). He has a quad cane to use when needed. Pt has no further questions. Will continue to monitor needs.     Future Appointments   Date Time Provider Department Center   3/19/2019  3:00 PM Tate Arteaga MD [facility] HEM [facility]        03/12/19 5111   Discharge Assessment   Assessment Type Discharge Planning Assessment   Confirmed/corrected address and phone number on facesheet? Yes   Assessment information obtained from? Patient;Caregiver;Medical Record   Expected Length of Stay (days) 3   Communicated expected length of stay with patient/caregiver yes   Prior to hospitilization cognitive status: Alert/Oriented   Prior to hospitalization functional status: Assistive Equipment   Current cognitive status: Alert/Oriented   Current Functional Status: Assistive Equipment   Facility Arrived From: Home   Lives With child(torsten), adult;grandchild(torsten);spouse   Able to Return to Prior Arrangements yes   Is patient able to care for self after discharge? Yes   Who are your caregiver(s) and their phone number(s)? Peterson (wife) [phone]   Patient's perception of discharge disposition home or selfcare   Readmission Within the Last 30 Days no previous admission in last 30 days   Patient currently being followed by outpatient case management? No   Patient currently receives any other outside agency services? No   Equipment Currently Used at Home cane, quad   Do you have any problems affording any of your prescribed medications? No   Is the patient taking medications as prescribed? yes   Does the patient have transportation  home? Yes   Transportation Anticipated family or friend will provide   Does the patient receive services at the [facility]? No   Discharge Plan A Home;Home with family   DME Needed Upon Discharge  none   Patient/Family in Agreement with Plan yes

## 2019-03-12 NOTE — PROGRESS NOTES
"[facility]  Adult Nutrition  Progress Note    SUMMARY       Recommendations     1. Rec add lipids daily to better meet estimated needs;   -monitor TG weekly with goal <400   2. Advance diet as able to 2000 ADA; add boost glucose chocolate tid   3. RD to monitor    Goals: Meet > 85% EEN daily  Nutrition Goal Status: new  Communication of RD Recs: (POC)    Reason for Assessment    Reason For Assessment: new TPN  Diagnosis: (Pancreatic Cancer)  Relevant Medical History: DM, HTN, HLD, Cachexia  Interdisciplinary Rounds: did not attend    General Information Comments: Pt NPO. CT with possible EGD for stent if GOO found. Pt has been tolerating pureed and liquids at home. Reports he drinks 2 equates/day. + diarrhea. Reports weight loss of 150# in 6 months, no prev wt hx in charts. Provided handout for home diet to aid with increasing kcal/pro. Will f/u with CT/EGD results. NFPE 3/12/19: physical sx/o malnutrition evident- see malnutrition section for details. PPN support initiated.     Nutrition Discharge Planning: Too soon to determine    Nutrition Risk Screen    Nutrition Risk Screen: no indicators present    Nutrition/Diet History    Patient Reported Diet/Restrictions/Preferences: pureed  Spiritual, Cultural Beliefs, Rastafarian Practices, Values that Affect Care: no  Supplemental Drinks or Food Habits: (Equate bid)  Factors Affecting Nutritional Intake: altered gastrointestinal function, NPO    Anthropometrics    Temp: 96.2 °F (35.7 °C)  Height Method: Stated  Height: 5' 6" (167.6 cm)  Height (inches): 66 in  Weight Method: Bed Scale  Weight: 51.7 kg (113 lb 15.7 oz)  Weight (lb): 113.98 lb  Ideal Body Weight (IBW), Male: 142 lb  % Ideal Body Weight, Male (lb): 80.27 lb  BMI (Calculated): 18.4  BMI Grade: 17 - 18.4 protein-energy malnutrition grade I  Weight Loss: unintentional  Usual Body Weight (UBW), k kg(no prev wt records)  % Usual Body Weight: 43.54  % Weight Change From Usual Weight: " -56.55 %       Lab/Procedures/Meds    Pertinent Labs Reviewed: reviewed  Pertinent Labs Comments: prealb 18  Pertinent Medications Reviewed: reviewed    Estimated/Assessed Needs    Weight Used For Calorie Calculations: 51.7 kg (113 lb 15.7 oz)  Energy Calorie Requirements (kcal): 1551 kcal; > for repletion  Energy Need Method: Kcal/kg  Protein Requirements: 60-77g  Weight Used For Protein Calculations: 51.7 kg (113 lb 15.7 oz)     Estimated Fluid Requirement Method: RDA Method  RDA Method (mL): 1551  CHO Requirement: 200g      Nutrition Prescription Ordered    Current Diet Order: NPO    Evaluation of Received Nutrient/Fluid Intake    Parenteral Calories (kcal): 918  Parenteral Protein (gm): 77  Lipid Calories (kcals): 214 kcals  GIR (Glucose Infusion Rate) (mg/kg/min): 2.4 mg/kg/min  Total Calories (kcal): 1132  % Kcal Needs: 73%  % Protein Needs: 100%  I/O: reviewed  Energy Calories Required: not meeting needs  Protein Required: meeting needs  Fluid Required: (per MD)  Comments: LBM: 3/8  Tolerance: (NPO)    % Meal Intake: NPO    Nutrition Risk    Level of Risk/Frequency of Follow-up: (2 x week)     Assessment and Plan    Malnutrition in the context of Chronic Illness/Injury    Related to (etiology):  Altered GI Function/ Pancreatic Cancer    Signs and Symptoms (as evidenced by):    Body Fat Depletion: moderate depletion of orbitals, triceps and thoracic and lumbar region   Muscle Mass Depletion: moderate and severe depletion of temples, clavicle region, scapular region, interosseous muscle and lower extremities   Weight Loss: 50% x 6 months       Interventions  Collaboration with providers    Nutrition Diagnosis Status:  New       Monitor and Evaluation    Food and Nutrient Intake: energy intake, parenteral nutrition intake, food and beverage intake  Food and Nutrient Adminstration: diet order, enteral and parenteral nutrition administration  Knowledge/Beliefs/Attitudes: food and nutrition  knowledge/skill  Physical Activity and Function: nutrition-related ADLs and IADLs  Anthropometric Measurements: weight, weight change  Biochemical Data, Medical Tests and Procedures: electrolyte and renal panel, glucose/endocrine profile  Nutrition-Focused Physical Findings: overall appearance     Malnutrition Assessment      Orbital Region (Subcutaneous Fat Loss): moderate depletion  Upper Arm Region (Subcutaneous Fat Loss): moderate depletion  Thoracic and Lumbar Region: moderate depletion   [place] Region (Muscle Loss): moderate depletion  Clavicle Bone Region (Muscle Loss): moderate depletion  Clavicle and Acromion Bone Region (Muscle Loss): moderate depletion  Scapular Bone Region (Muscle Loss): moderate depletion  Dorsal Hand (Muscle Loss): moderate depletion  Patellar Region (Muscle Loss): severe depletion  Anterior Thigh Region (Muscle Loss): severe depletion  Posterior Calf Region (Muscle Loss): severe depletion       Subcutaneous Fat Loss (Final Summary): moderate protein-calorie malnutrition  Muscle Loss Evaluation (Final Summary): severe protein-calorie malnutrition    Severe Weight Loss (Malnutrition): greater than 10% in 6 months    Nutrition Follow-Up    RD Follow-up?: Yes

## 2019-03-12 NOTE — SUBJECTIVE & OBJECTIVE
Past Medical History:   Diagnosis Date    Abnormal liver function test     Cachexia     Cancer     Chronic bronchitis     Dehydration     Diabetes mellitus     GERD (gastroesophageal reflux disease)     Grade I diastolic dysfunction     High cholesterol     Hypertension     Hypomagnesemia     Jaundice     Low blood potassium     LVH (left ventricular hypertrophy)     Osteoarthritis     Pancreatic mass     Protein calorie malnutrition     TIA (transient ischemic attack)        Past Surgical History:   Procedure Laterality Date    ABDOMINAL SURGERY      COLONOSCOPY      ERCP (ENDOSCOPIC RETROGRADE CHOLANGIOPANCREATOGRAPHY) N/A 2/5/2019    Performed by Hebert Watson MD at [facility] ENDO    ESOPHAGOGASTRODUODENOSCOPY      ULTRASOUND, UPPER GI TRACT, ENDOSCOPIC N/A 2/5/2019    Performed by Hebert Watson [facility] at [facility] ENDO       Review of patient's allergies indicates:  No Known Allergies  Family History     None        Tobacco Use    Smoking status: Never Smoker    Smokeless tobacco: Never Used   Substance and Sexual Activity    Alcohol use: Yes     Comment: occassional    Drug use: No    Sexual activity: Not Currently     Partners: Female     Review of Systems   Constitutional: Positive for activity change and fatigue.   HENT: Negative.    Eyes: Negative.    Respiratory: Negative.    Cardiovascular: Negative.    Gastrointestinal: Positive for diarrhea. Negative for abdominal distention, abdominal pain and vomiting.   Endocrine: Negative.    Genitourinary: Negative.    Musculoskeletal: Negative.    All other systems reviewed and are negative.    Objective:     Vital Signs (Most Recent):  Temp: 97.2 °F (36.2 °C) (03/12/19 0815)  Pulse: 101 (03/12/19 0815)  Resp: 18 (03/12/19 0815)  BP: 134/81 (03/12/19 0815)  SpO2: 95 % (03/12/19 0431) Vital Signs (24h Range):  Temp:  [97.2 °F (36.2 °C)-98.2 °F (36.8 °C)] 97.2 °F (36.2 °C)  Pulse:  [] 101  Resp:  [18-20] 18  SpO2:  [95 %-96 %] 95 %  BP:  (116-143)/(70-84) 134/81     Weight: 51.7 kg (113 lb 15.7 oz) (03/12/19 0448)  Body mass index is 18.4 kg/m².      Intake/Output Summary (Last 24 hours) at 3/12/2019 0846  Last data filed at 3/12/2019 0653  Gross per 24 hour   Intake 1134.06 ml   Output 700 ml   Net 434.06 ml       Lines/Drains/Airways     Airway                 Airway - Non-Surgical Nasal Cannula -- days          Peripheral Intravenous Line                 Peripheral IV - Single Lumen 03/11/19 1628 Anterior;Right Upper Arm less than 1 day                Physical Exam   Constitutional: He is oriented to person, place, and time. He appears cachectic. He is cooperative.   HENT:   Head: Normocephalic and atraumatic.   Eyes: Conjunctivae are normal.   Neck: Normal range of motion. Neck supple.   Cardiovascular: Normal rate, regular rhythm and normal heart sounds.   Pulmonary/Chest: Effort normal and breath sounds normal.   Abdominal: Soft. Bowel sounds are normal. He exhibits no distension and no mass. There is no tenderness. There is no rebound and no guarding. No hernia.   Musculoskeletal: Normal range of motion.   Neurological: He is alert and oriented to person, place, and time.   Skin: Skin is warm and dry.   Psychiatric: He has a normal mood and affect.       Significant Labs:  All pertinent lab results from the last 24 hours have been reviewed.    Significant Imaging:  Imaging results within the past 24 hours have been reviewed.

## 2019-03-12 NOTE — PROGRESS NOTES
Surgery Progress Note    S:  NAEO.  Tolerated a diet last night.  NPO since midnight.      O:  Temp:  [97.2 °F (36.2 °C)-98.2 °F (36.8 °C)] 97.2 °F (36.2 °C)  Pulse:  [] 101  Resp:  [18-20] 18  SpO2:  [95 %-96 %] 95 %  BP: (116-143)/(70-84) 134/81    Physical Exam:  Gen: no acute distress.  Alert and oriented x3  HEENT: normocephalic and atraumatic. EOMI.   Resp: unlabored respirations  CV: regular rate  Abd: soft, non-distended, non-tender to palpation  Ext: warm and well perfused  MSK: normal range of motion, normal strength  Neuro: no focal deficits, normal sensation    CA 19-9: 2359  Prealbumin: 18    A/P:  73-year-old gentleman with pancreatic head adenocarcinoma, likely unresectable by imaging, admitted with malnutrition, possible gastric outlet obstruction    Afebrile, vital signs stable  NPO since midnight  Contrast study yesterday did not evaluate for gastric outlet obstruction.  Will get CT abd/pelvis with oral contrast today  GI consulted.  Will follow-up CT, and decide to proceed with EGD for enteral stent vs appetite stimulant with pureed or liquid diet.    Angus Phillips MD  [facility]

## 2019-03-12 NOTE — PLAN OF CARE
Problem: Adult Inpatient Plan of Care  Goal: Plan of Care Review  Outcome: Ongoing (interventions implemented as appropriate)  Patient on RA, no respiratory distress noted. Patient performs IS therapy well and is ready for self-instruct. Will continue to monitor.

## 2019-03-12 NOTE — ASSESSMENT & PLAN NOTE
He gives diverging history of gastric outlet obstruction symptoms.   Sounds like he can tolerate liquid diet but may have a poor appetite consistent with his advanced diagnosis  Agree with CT abdomen to better determine if he has a gastric outlet obstruction  The EUS scope was able to get into his duodenum in February.      If Ct suggests GOO can proceed with EGD for enteral stent placement    If  CT shows no GOO  then consider appetite stimulant with liquid or pureed diet

## 2019-03-12 NOTE — PLAN OF CARE
Problem: Adult Inpatient Plan of Care  Goal: Plan of Care Review  Outcome: Revised  Patient is awake and alert.  Ambulates to bathroom.  Patient is receiving Clinimix.  Denies pain.  Family members at bedside.  Instructed to call for any questions or assistance and patient verbalized complete understanding.  Safety is maintained with bed low, wheels locked and side rails up.  Call light within reach.  Will continue to monitor.     CT not done today secondary to patient ingesting barium for test yesterday.  Xray done.

## 2019-03-12 NOTE — HPI
Mr Lawler is a 73 year old male with pancreatic cancer admitted from clinic with descriptions of inability to tolerate oral feeding.    He reports to me that he can eat purreed foods and liquids but has not been able to tolerate solid food since November.  He does not vomit if he takes liquids or solids.  There is no hematochezia.  There is no hematemesis.  He has not started chemotherapy at this time. He does have significant diarrhea with eating. He hasn't had a sold bowl movement in quite a while.    He reports no yellowing of his eyes or itchy skin.

## 2019-03-12 NOTE — PLAN OF CARE
Problem: Adult Inpatient Plan of Care  Goal: Plan of Care Review  Outcome: Ongoing (interventions implemented as appropriate)  Pt in NAD. AAOx4. VSS. No c/o pain. Pt has maintained NPO status since midnight for CT of abdomen and pelvis. PPN running at 75 ml/hr. Lipids running at 20.8 ml/hr. Gets IV reglan. Safety maintained. Will continue to monitor.

## 2019-03-12 NOTE — PLAN OF CARE
Recommendations      1. Rec add lipids daily to better meet estimated needs;   -monitor TG weekly with goal <400   2. Advance diet as able to 2000 ADA; add boost glucose chocolate tid   3. RD to monitor     Goals: Meet > 85% EEN daily  Nutrition Goal Status: new  Communication of RD Recs: (POC)

## 2019-03-12 NOTE — CONSULTS
[facility]  Gastroenterology  Consult Note    Patient Name: Chema Lawler  MRN: [id number]  Admission Date: 3/11/2019  Hospital Length of Stay: 1 days  Code Status: Prior   Attending Provider: Kelli Bennett MD   Consulting Provider: Hebert Watson MD  Primary Care Physician: Maximino Alicea MD  Principal Problem:<principal problem not specified>    Consults  Subjective:     HPI:  Mr Lawler is a 73 year old male with pancreatic cancer admitted from clinic with descriptions of inability to tolerate oral feeding.    He reports to me that he can eat purreed foods and liquids but has not been able to tolerate solid food since November.  He does not vomit if he takes liquids or solids.  There is no hematochezia.  There is no hematemesis.  He has not started chemotherapy at this time. He does have significant diarrhea with eating. He hasn't had a sold bowl movement in quite a while.    He reports no yellowing of his eyes or itchy skin.      Past Medical History:   Diagnosis Date    Abnormal liver function test     Cachexia     Cancer     Chronic bronchitis     Dehydration     Diabetes mellitus     GERD (gastroesophageal reflux disease)     Grade I diastolic dysfunction     High cholesterol     Hypertension     Hypomagnesemia     Jaundice     Low blood potassium     LVH (left ventricular hypertrophy)     Osteoarthritis     Pancreatic mass     Protein calorie malnutrition     TIA (transient ischemic attack)        Past Surgical History:   Procedure Laterality Date    ABDOMINAL SURGERY      COLONOSCOPY      ERCP (ENDOSCOPIC RETROGRADE CHOLANGIOPANCREATOGRAPHY) N/A 2/5/2019    [facility]formed by [facility] ENDO    ESOPHAGOGASTRODUODENOSCOPY      ULTRASOUND, UPPER GI TRACT, ENDOSCOPIC N/A 2/5/2019    Performed by [facility]bert [facility] at [facility] ENDO       Review of patient's allergies indicates:  No Known Allergies  Family History     None        Tobacco Use    Smoking  status: Never Smoker    Smokeless tobacco: Never Used   Substance and Sexual Activity    Alcohol use: Yes     Comment: occassional    Drug use: No    Sexual activity: Not Currently     Partners: Female     Review of Systems   Constitutional: Positive for activity change and fatigue.   HENT: Negative.    Eyes: Negative.    Respiratory: Negative.    Cardiovascular: Negative.    Gastrointestinal: Positive for diarrhea. Negative for abdominal distention, abdominal pain and vomiting.   Endocrine: Negative.    Genitourinary: Negative.    Musculoskeletal: Negative.    All other systems reviewed and are negative.    Objective:     Vital Signs (Most Recent):  Temp: 97.2 °F (36.2 °C) (03/12/19 0815)  Pulse: 101 (03/12/19 0815)  Resp: 18 (03/12/19 0815)  BP: 134/81 (03/12/19 0815)  SpO2: 95 % (03/12/19 0431) Vital Signs (24h Range):  Temp:  [97.2 °F (36.2 °C)-98.2 °F (36.8 °C)] 97.2 °F (36.2 °C)  Pulse:  [] 101  Resp:  [18-20] 18  SpO2:  [95 %-96 %] 95 %  BP: (116-143)/(70-84) 134/81     Weight: 51.7 kg (113 lb 15.7 oz) (03/12/19 0448)  Body mass index is 18.4 kg/m².      Intake/Output Summary (Last 24 hours) at 3/12/2019 0846  Last data filed at 3/12/2019 0653  Gross per 24 hour   Intake 1134.06 ml   Output 700 ml   Net 434.06 ml       Lines/Drains/Airways     Airway                 Airway - Non-Surgical Nasal Cannula -- days          Peripheral Intravenous Line                 Peripheral IV - Single Lumen 03/11/19 1628 Anterior;Right Upper Arm less than 1 day                Physical Exam   Constitutional: He is oriented to person, place, and time. He appears cachectic. He is cooperative.   HENT:   Head: Normocephalic and atraumatic.   Eyes: Conjunctivae are normal.   Neck: Normal range of motion. Neck supple.   Cardiovascular: Normal rate, regular rhythm and normal heart sounds.   Pulmonary/Chest: Effort normal and breath sounds normal.   Abdominal: Soft. Bowel sounds are normal. He exhibits no distension and no  mass. There is no tenderness. There is no rebound and no guarding. No hernia.   Musculoskeletal: Normal range of motion.   Neurological: He is alert and oriented to person, place, and time.   Skin: Skin is warm and dry.   Psychiatric: He has a normal mood and affect.       Significant Labs:  All pertinent lab results from the last 24 hours have been reviewed.    Significant Imaging:  Imaging results within the past 24 hours have been reviewed.    Assessment/Plan:     Malignant neoplasm of head of pancreas    No evidence of biliary obstruction     Gastric outlet obstruction    He gives diverging history of gastric outlet obstruction symptoms.   Sounds like he can tolerate liquid diet but may have a poor appetite consistent with his advanced diagnosis  Agree with CT abdomen to better determine if he has a gastric outlet obstruction  The EUS scope was able to get into his duodenum in February.      If Ct suggests GOO can proceed with EGD for enteral stent placement    If  CT shows no GOO  then consider appetite stimulant with liquid or pureed diet          Thank you for your consult. I will follow-up with patient. Please contact us if you have any additional questions.    Hebert Watson MD  Gastroenterology  [facility]

## 2019-03-13 NOTE — PHYSICIAN QUERY
PT Name: Chema Lawler  MR #: [id number]    Physician Query Form - Nutrition Clarification     CDS/: Slaly Olsen               Contact information: [email or web address]     This form is a permanent document in the medical record.     Query Date: 2019    By submitting this query, we are merely seeking further clarification of documentation.. Please utilize your independent clinical judgment when addressing the question(s) below.    The Medical record contains the following:   Indicators  Supporting Clinical Findings Location in Medical Record   x % of Estimated Energy Intake over a time frame from p.o., TF, or TPN Abdominal pain with emesis after eating, along with fatigue and diarrhea.  Appetite change     Inability to tolerate oral feeding.  Has not been able to tolerate solid food since November.      H&P        GI Consult   x Weight Status over a time frame Approximately 150 pounds in the past 6 months   Unexpected weight change    Concerned about his nutrition and significant weight loss.      Usual Body Weight (UBW), k kg(no prev wt records)  % Usual Body Weight: 43.54  % Weight Change From Usual Weight: -56.55 %  Weight Loss: 50% x 6 months   Severe Weight Loss (Malnutrition): greater than 10% in 6 months     H&P      Hematology and Oncology Consult      Dietitian PN    x Subcutaneous Fat and/or Muscle Loss Temporal wasting      Appears cachectic      Body Fat Depletion: moderate depletion of orbitals, triceps and thoracic and lumbar region   Muscle Mass Depletion: moderate and severe depletion of temples, clavicle region, scapular region, interosseous muscle and lower extremities     Orbital Region (Subcutaneous Fat Loss): moderate depletion  Upper Arm Region (Subcutaneous Fat Loss): moderate depletion  Thoracic and Lumbar Region: moderate depletion   [place] Region (Muscle Loss): moderate depletion  Clavicle Bone Region (Muscle Loss): moderate depletion  Clavicle and  "Acromion Bone Region (Muscle Loss): moderate depletion  Scapular Bone Region (Muscle Loss): moderate depletion  Dorsal Hand (Muscle Loss): moderate depletion  Patellar Region (Muscle Loss): severe depletion  Anterior Thigh Region (Muscle Loss): severe depletion  Posterior Calf Region (Muscle Loss): severe depletion       Subcutaneous Fat Loss (Final Summary): moderate protein-calorie malnutrition  Muscle Loss Evaluation (Final Summary): severe protein-calorie malnutrition       Hematology and Oncology Consult    H&P      Dietitian PN 03/12    Fluid Accumulation or Edema      Reduced  Strength     x Wt / BMI / Usual Body Weight Height: 5' 6" (167.6 cm)  Weight Method: Bed Scale  Weight: 51.7 kg (113 lb 15.7 oz)  BMI (Calculated): 18.4     Dietitian PN 03/12    Delayed Wound Healing / Failure to Thrive     x Acute or Chronic Illness Malnourished, fatigued.  Malignant neoplasm of head of pancreas    Abdominal surgery in 2007 where he had some bowel removed for a cancer, which is likely colon  pancreatic head adenocarcinoma, likely unresectable by imaging, admitted with malnutrition, possible gastric outlet obstruction     Hematology and Oncology Consult    H&P   x Medication     x Treatment If CT suggests GOO can proceed with EGD for enteral stent placement  If  CT shows no GOO  then consider appetite stimulant with liquid or pureed diet     Clinimix E/Lipids GI Consult          MAR        Other       AND / ASPEN Clinical Characteristics (October 2011)  A minimum of two characteristics is recommended for diagnosing either moderate or severe malnutrition   Mild Malnutrition Moderate Malnutrition Severe Malnutrition   Energy Intake from p.o., TF or TPN. < 75% intake of estimated energy needs for less than 7 days < 75% intake of estimated energy needs for greater than 7 days < 50% intake of estimated energy needs for > 5 days   Weight Loss 1-2% in 1 month  5% in 3 months  7.5% in 6 months  10% in 1 year 1-2 % in 1 " week  5% in 1 month  7.5% in 3 months  10% in 6 months  20% in 1 year > 2% in 1 week  > 5% in 1 month  > 7.5% in 3 months  > 10% in 6 months  > 20% in 1 year   Physical Findings     None *Mild subcutaneous fat and/or muscle loss  *Mild fluid accumulation  *Stage II decubitus  *Surgical wound or non-healing wound *Mod/severe subcutaneous fat and/or muscle loss  *Mod/severe fluid accumulation  *Stage III or IV decubitus  *Non-healing surgical wound     Provider, please specify diagnosis or diagnoses associated with above clinical findings.    Please further specify the degree of above diagnosis of Malnutrition:     [  ] Mild Protein-Calorie Malnutrition   [ xxx ] Moderate Protein-Calorie Malnutrition   [  ] Severe Protein-Calorie Malnutrition   [  ] Other Nutritional Diagnosis (please specify):    [  ] Other:    [  ] Clinically Undetermined       Please document in your progress notes daily for the duration of treatment until resolved and include in your discharge summary.

## 2019-03-13 NOTE — PLAN OF CARE
Problem: Adult Inpatient Plan of Care  Goal: Plan of Care Review  Outcome: Ongoing (interventions implemented as appropriate)  Afternoon round completed.  Patient complains of not having a bowel movement, but a check of the MAR reveals that patient has been refusing laxatives.  In speaking with the bedside nurse, she states that patient is refusing due to a scope and he does not wish to interfere with that.  They have tried to reason with him, but he does not want to take his medications.  Will try to contact [facility] to notify them.

## 2019-03-13 NOTE — PROGRESS NOTES
Surgery Progress Note    S:  NAEO.  Tolerated a diet last night.  NPO since midnight.        nocomplaints  Waiting for EUS and CT  O:  Temp:  [96.2 °F (35.7 °C)-98.2 °F (36.8 °C)] 97.7 °F (36.5 °C)  Pulse:  [78-96] 78  Resp:  [15-19] 15  SpO2:  [95 %-100 %] 100 %  BP: (106-120)/(64-78) 116/78    Physical Exam:  Gen: no acute distress.  Alert and oriented x3  HEENT: normocephalic and atraumatic. EOMI.   Resp: unlabored respirations  CV: regular rate  Abd: soft, non-distended, non-tender to palpation  Ext: warm and well perfused  MSK: normal range of motion, normal strength  Neuro: no focal deficits, normal sensation    CA 19-9: 2359  Prealbumin: 18    A/P:  73-year-old gentleman with pancreatic head adenocarcinoma, likely unresectable by imaging, admitted with malnutrition, possible gastric outlet obstruction    Afebrile, vital signs stable      Doing well  abd soft nopaon   Labs stable  Await studies    Need neoadjuvant therapy    Will place [facility]

## 2019-03-13 NOTE — PLAN OF CARE
Problem: Adult Inpatient Plan of Care  Goal: Plan of Care Review  Outcome: Ongoing (interventions implemented as appropriate)  Pt is AAOX4. No c/o pain or nausea. All medication tolerated. Iv is CDI and infusing. NPO. ACHS with insulin administered. VSS. No signs of distress. Bed is in lowest position with call light in reach. Will continue to monitor.

## 2019-03-13 NOTE — PROGRESS NOTES
"[facility]    CC: Trouble to tolerate PO since november    HPI 73 y.o. male with a history of pancreatic cancer who is admitted to surgery team for workup of possible gastric outlet obstruction associated with vomiting, weight loss.     The patient reports that he feels well this morning. He denies having any complaints. Denies abdominal pain. He tolerated his food yesterday evening without issue. Currently NPO. Awaiting for CT imaging today to evaluate for GOO. He denies having any new complaints. No further questions at this time.     Past Medical History    has a past medical history of Abnormal liver function test, Cachexia, Cancer, Chronic bronchitis, Dehydration, Diabetes mellitus, GERD (gastroesophageal reflux disease), Grade I diastolic dysfunction, High cholesterol, Hypertension, Hypomagnesemia, Jaundice, Low blood potassium, LVH (left ventricular hypertrophy), Osteoarthritis, Pancreatic mass, Protein calorie malnutrition, and TIA (transient ischemic attack).      Review of Systems  General ROS: negative for - chills, fever or weight loss  Cardiovascular ROS: no chest pain or dyspnea on exertion  Gastrointestinal ROS: no abdominal pain, change in bowel habits, or black/ bloody stools    Physical Examination  /78   Pulse 78   Temp 97.7 °F (36.5 °C) (Oral)   Resp 15   Ht 5' 6" (1.676 m)   Wt 53.3 kg (117 lb 8.1 oz)   SpO2 100%   BMI 18.97 kg/m²   General appearance: alert, cooperative, no distress, Cachectic appearing  HENT: Normocephalic, atraumatic, neck symmetrical, no nasal discharge   Lungs: clear to auscultation in all fields, symmetric chest wall expansion bilaterally, no wheeze, rale, or rhonchi  Heart: normal rate, regular rhythm without rub; palpable peripheral pulsesI   Abdomen: soft, non-tender; bowel sounds normoactive; no organomegaly  Extremities: extremities symmetric; no clubbing, cyanosis, or edema  Neurologic: Alert and oriented X 3, normal strength, normal " coordination    Labs:  Na 135  K 4.3   Cl 103  CO2 24 BUN 13  Cr 0.8       Imaging:  KUB: With large amount of residual contrast in right side of colon    I have review these images.     Assessment:   73 year old male with pancreatic cancer who presents with a several month history of being unable to tolerate food by mouth. There is concern for GOO. Patient is stable this morning       Plan:  - Follow up CT abdomen for evidence of GOO  - If CT is suggestive of GOO, will plan for EGD and stent placement   - If no GOO suspected will progress diet as tolerated to liquid or pureed diet. + Appetite stimulant       Will MD Tara  [facility], PGY II  Gastroenterology Service

## 2019-03-14 NOTE — ANESTHESIA POSTPROCEDURE EVALUATION
"Anesthesia Post Evaluation    Patient: Chema Lawler    Procedure(s) Performed: Procedure(s) (LRB):  EGD (ESOPHAGOGASTRODUODENOSCOPY) (N/A)    Final Anesthesia Type: general  Patient location during evaluation: PACU  Patient participation: Yes- Able to Participate  Level of consciousness: awake and alert  Post-procedure vital signs: reviewed and stable  Pain management: adequate  Airway patency: patent  PONV status at discharge: No PONV  Anesthetic complications: no      Cardiovascular status: hemodynamically stable and blood pressure returned to baseline  Respiratory status: room air, spontaneous ventilation and unassisted  Hydration status: euvolemic  Follow-up not needed.        Visit Vitals  /71   Pulse 86   Temp 36.5 °C (97.7 °F) (Skin)   Resp 16   Ht 5' 6" (1.676 m)   Wt 53 kg (116 lb 13.5 oz)   SpO2 96%   BMI 18.86 kg/m²       Pain/Low Score: Low Score: 10 (3/14/2019  2:55 PM)        "

## 2019-03-14 NOTE — PROGRESS NOTES
Surgery Progress Note    S:  NAEO.  Tolerating some PO diet, had some corn yesterday.     O:  Temp:  [96.2 °F (35.7 °C)-98.2 °F (36.8 °C)] 97.9 °F (36.6 °C)  Pulse:  [] 106  Resp:  [15-20] 20  SpO2:  [94 %-100 %] 94 %  BP: (105-127)/(71-84) 105/74    Physical Exam:  Gen: no acute distress.  Alert and oriented x3  HEENT: normocephalic and atraumatic. EOMI.   Resp: unlabored respirations  CV: regular rate  Abd: soft, non-distended, non-tender to palpation  Ext: warm and well perfused  MSK: normal range of motion, normal strength  Neuro: no focal deficits, normal sensation    A/P:  73-year-old gentleman with pancreatic head adenocarcinoma, likely unresectable by imaging, admitted with malnutrition, possible gastric outlet obstruction    Afebrile, vital signs stable  CT abd pelvis today to evaluate pancreatic mass  Will need neoadjuvant therapy  Plan to place mediport tomorrow in OR  NPO midnight    Angus Phillips MD  [facility]

## 2019-03-14 NOTE — PLAN OF CARE
Problem: Adult Inpatient Plan of Care  Goal: Plan of Care Review  Outcome: Ongoing (interventions implemented as appropriate)  Pt is AAOX4. No c/o pain or nausea. All medication tolerated. Pt was given magnesium citrate for constipation.  Iv is CDI and infusing. NPO. ACHS with insulin administered. VSS. No signs of distress. Bed is in lowest position with call light in reach. Will continue to monitor.

## 2019-03-14 NOTE — ANESTHESIA PREPROCEDURE EVALUATION
03/14/2019  Chema Lawler is a 73 y.o., male with pancreatic mass; not tolerating PO feeding, concern for gastric outlet obstruction; for EGD    PRIOR ANES (Epic)  [id number] EUS ERCP MAC    fent 50, prop 70 -> 70 mcg/kg/min    VSS [facility]    Patient Active Problem List   Diagnosis    Gastric outlet obstruction    Malignant neoplasm of head of pancreas    Severe malnutrition     Past Medical History:   Diagnosis Date    Abnormal liver function test     Cachexia     Cancer     Chronic bronchitis     Dehydration     Diabetes mellitus     GERD (gastroesophageal reflux disease)     Grade I diastolic dysfunction     High cholesterol     Hypertension     Hypomagnesemia     Jaundice     Low blood potassium     LVH (left ventricular hypertrophy)     Osteoarthritis     Pancreatic mass     Protein calorie malnutrition     TIA (transient ischemic attack)      Past Surgical History:   Procedure Laterality Date    ABDOMINAL SURGERY      COLONOSCOPY      ERCP (ENDOSCOPIC RETROGRADE CHOLANGIOPANCREATOGRAPHY) N/A 2/5/2019    [facility] by [facility]O    ESOPHAGOGASTRODUODENOSCOPY      ULTRASOUND, UPPER GI TRACT, ENDOSCOPIC N/A 2/5/2019    Performed by [facility] at [facility] ENDO     ALLERGIES [id number]  No Known Allergies    ANES-RELATED MAR [id number]  amlodipine-benazepril pantoprazole  enoxaparin  TPN  KCl-PO    Pre-op Assessment    I have reviewed the Patient Summary Reports.     I have reviewed the Nursing Notes.   I have reviewed the Medications.     Review of Systems  Anesthesia Hx:  History of prior surgery of interest to airway management or planning:  Denies Personal Hx of Anesthesia complications.   Social:  Non-Smoker, Alcohol Use    Hematology/Oncology:  Hematology Normal      Current/Recent Cancer. (colon) surgery   EENT/Dental:   Full teeth, slightly reduced mouth  opening   Cardiovascular:   Hypertension hyperlipidemia ECG has been reviewed. In media, 11/4/18 SR with occ PAC and nonspecific ST-T wave abn   Pulmonary:  Pulmonary Normal Chronic bronchitis   Hepatic/GI:   GERD Pancreatic mass, obstructive jaundice; elevated liver enzymes    Difficulty with oral intake; gastric outlet obstruction questioned   Musculoskeletal:  Denies Spine Disorders    Neurological:   Denies CVA. Denies Seizures.    Endocrine:   Diabetes, poorly controlled      Wt Readings from Last 1 Encounters:   03/14/19 53 kg (116 lb 13.5 oz)     Temp Readings from Last 1 Encounters:   03/14/19 36.9 °C (98.4 °F) (Oral)     BP Readings from Last 1 Encounters:   03/14/19 119/68     Pulse Readings from Last 1 Encounters:   03/14/19 97     SpO2 Readings from Last 1 Encounters:   03/14/19 95%       Physical Exam  General:  Cachexia    Airway/Jaw/Neck:  Airway Findings: Mouth Opening: Small, but > 3cm Mallampati: II         Dental:  Dental Findings: Periodontal disease, Severe   Chest/Lungs:  Chest/Lungs Clear    Heart/Vascular:  Heart Findings: Normal       Mental Status:  Mental Status Findings:  Cooperative, Alert and Oriented       Lab Results   Component Value Date    WBC 9.01 03/11/2019    HGB 13.7 (L) 03/11/2019    HCT 42.4 03/11/2019    MCV 92 03/11/2019     03/11/2019        Chemistry        Component Value Date/Time     (L) 03/13/2019 0341    K 4.3 03/13/2019 0341     03/13/2019 0341    CO2 24 03/13/2019 0341    BUN 13 03/13/2019 0341    CREATININE 0.8 03/13/2019 0341     (H) 03/13/2019 0341        Component Value Date/Time    CALCIUM 10.1 03/13/2019 0341    ALKPHOS 131 03/13/2019 0341    AST 16 03/13/2019 0341    ALT 14 03/13/2019 0341    BILITOT 1.3 (H) 03/13/2019 0341    ESTGFRAFRICA >60 03/13/2019 0341    EGFRNONAA >60 03/13/2019 0341          Lab Results   Component Value Date    ALBUMIN 3.5 03/13/2019    No results found for: TSH, L1NTEUE, X6ISDTX, THYROIDAB No results  found for: APTT   Lab Results   Component Value Date    INR 1.1 03/11/2019       CXR  none    EKG [id number] (ordered)      Anesthesia Plan  Type of Anesthesia, risks & benefits discussed:  Anesthesia Type:  general, MAC  Patient's Preference:   Intra-op Monitoring Plan:   Intra-op Monitoring Plan Comments:   Post Op Pain Control Plan:   Post Op Pain Control Plan Comments:   Induction:    Beta Blocker:  Patient is not currently on a Beta-Blocker (No further documentation required).       Informed Consent: Patient understands risks and agrees with Anesthesia plan.  Questions answered. Anesthesia consent signed with patient.  ASA Score: 3     Day of Surgery Review of History & Physical:        Anesthesia Plan Notes: [id number]   - gastric outlet obstruction questioned   - no CT scan   - EKG ordered (none in Epic)        Ready For Surgery From Anesthesia Perspective.

## 2019-03-14 NOTE — PLAN OF CARE
Pt currently in recovery. Multiple family members in pt room and waiting for pt to come back up. Family members state that pt will be returning home with wife after discharge. States that patient also has not had BM in over a week. Family also requesting DME needed and HH to assist patient after discharge. All family questions answered. Will continue to monitor needs.     Future Appointments   Date Time Provider Department Center   3/19/2019  3:00 PM Tate Arteaga MD [facility] HEM [facility]        03/14/19 1446   Discharge Reassessment   Assessment Type Discharge Planning Reassessment   Provided patient/caregiver education on the expected discharge date and the discharge plan Yes   Do you have any problems affording any of your prescribed medications? No   Discharge Plan A Home;Home with family;Home Health   DME Needed Upon Discharge  (TBD)   Patient choice form signed by patient/caregiver N/A   Anticipated Discharge Disposition Home-Health   Can the patient answer the patient profile reliably? Yes, cognitively intact   How does the patient rate their overall health at the present time? Good   Describe the patient's ability to walk at the present time. Walks with the help of equipment   Number of comorbid conditions (as recorded on the chart) Five or more

## 2019-03-14 NOTE — NURSING
Loss of IV access due to infiltration. infiltration treated per protocol. Staff attempted to restart IV 4x's with no success. Charge nurse informed. VN informed and unable to read MD. Surgery rn informed of this. Surgery willing to accept pt for procedure and attempt iv access. Patient stable and transferred to surgery.

## 2019-03-14 NOTE — TRANSFER OF CARE
"Anesthesia Transfer of Care Note    Patient: Chema Lawler    Procedure(s) Performed: Procedure(s) (LRB):  EGD (ESOPHAGOGASTRODUODENOSCOPY) (N/A)    Patient location: PACU    Anesthesia Type: general    Transport from OR: Transported from OR on 6-10 L/min O2 by face mask with adequate spontaneous ventilation    Post pain: adequate analgesia    Post assessment: no apparent anesthetic complications and tolerated procedure well    Post vital signs: stable    Level of consciousness: awake, alert and oriented    Nausea/Vomiting: no nausea/vomiting    Complications: none    Transfer of care protocol was followed      Last vitals:   Visit Vitals  /74 (BP Location: Left arm, Patient Position: Lying)   Pulse 93   Temp 36.8 °C (98.2 °F) (Skin)   Resp 16   Ht 5' 6" (1.676 m)   Wt 53 kg (116 lb 13.5 oz)   SpO2 95%   BMI 18.86 kg/m²     "

## 2019-03-14 NOTE — PLAN OF CARE
Patient's family called in room 531 to update with patient's health status & ending of endoscopy procedure. Signed out frpm pacu per Dr Villa. Report called to Tran Lee/5A. Transported to room 531 via stretcher,sr upx2.

## 2019-03-14 NOTE — PLAN OF CARE
Problem: Adult Inpatient Plan of Care  Goal: Plan of Care Review  Outcome: Ongoing (interventions implemented as appropriate)  AAO x3. Bed remains low and locked call bell within reach. Bed alarm placed for pt safety. Diet advance to clear liquids then NPO after midnight for procedure tomorrow. Continue to monitor.

## 2019-03-14 NOTE — [employer] PATIENT INSTRUCTIONS
Discharge Summary/Instructions after an Endoscopic Procedure  Patient Name: Chema Lawler  Patient MRN: [id number]  Patient YOB: 1945 Thursday, March 14, 2019  Nain Newsome MD  RESTRICTIONS:  During your procedure today, you received medications for sedation.  These   medications may affect your judgment, balance and coordination.  Therefore,   for 24 hours, you have the following restrictions:   - DO NOT drive a car, operate machinery, make legal/financial decisions,   sign important papers or drink alcohol.    ACTIVITY:  Today: no heavy lifting, straining or running due to procedural   sedation/anesthesia.  The following day: return to full activity including work.  DIET:  Eat and drink normally unless instructed otherwise.     TREATMENT FOR COMMON SIDE EFFECTS:  - Mild abdominal pain, nausea, belching, bloating or excessive gas:  rest,   eat lightly and use a heating pad.  - Sore Throat: treat with throat lozenges and/or gargle with warm salt   water.  - Because air was used during the procedure, expelling large amounts of air   from your rectum or belching is normal.  - If a bowel prep was taken, you may not have a bowel movement for 1-3 days.    This is normal.  SYMPTOMS TO WATCH FOR AND REPORT TO YOUR PHYSICIAN:  1. Abdominal pain or bloating, other than gas cramps.  2. Chest pain.  3. Back pain.  4. Signs of infection such as: chills or fever occurring within 24 hours   after the procedure.  5. Rectal bleeding, which would show as bright red, maroon, or black stools.   (A tablespoon of blood from the rectum is not serious, especially if   hemorrhoids are present.)  6. Vomiting.  7. Weakness or dizziness.  GO DIRECTLY TO THE NEAREST EMERGENCY ROOM IF YOU HAVE ANY OF THE FOLLOWING:      Difficulty breathing              Chills and/or fever over 101 F   Persistent vomiting and/or vomiting blood   Severe abdominal pain   Severe chest pain   Black, tarry stools   Bleeding- more than one  tablespoon   Any other symptom or condition that you feel may need urgent attention  Your doctor recommends these additional instructions:  If any biopsies were taken, your doctors clinic will contact you in 1 to 2   weeks with any results.  - Return patient to [facility] for ongoing care.   - Regular diet   - Symptomatic treatment of nausea   - If symptoms of N/V are improved, no additional treatment may be required   at this time.   - Consider a trial of reglan if symptoms recur   For questions, problems or results please call your physician - Nain Newsome MD at Work:  [phone].  EMERGENCY PHONE NUMBER: [phone],  LAB RESULTS: [phone]  IF A COMPLICATION OR EMERGENCY SITUATION ARISES AND YOU ARE UNABLE TO REACH   YOUR PHYSICIAN - GO DIRECTLY TO THE EMERGENCY ROOM.  MD Nain Thomson MD  3/14/2019 3:15:43 PM  This report has been verified and signed electronically.  [employer]

## 2019-03-15 NOTE — ANESTHESIA PREPROCEDURE EVALUATION
03/14/2019  Chema Lawler is a 73 y.o., male with pancreatic mass; for mediport placement    Anesthesia Record 3/14/2019 (EGD)      Patient Active Problem List   Diagnosis    Gastric outlet obstruction    Malignant neoplasm of head of pancreas    Severe malnutrition    Nausea and vomiting     Past Medical History:   Diagnosis Date    Abnormal liver function test     Cachexia     Cancer     Chronic bronchitis     Dehydration     Diabetes mellitus     GERD (gastroesophageal reflux disease)     Grade I diastolic dysfunction     High cholesterol     Hypertension     Hypomagnesemia     Jaundice     Low blood potassium     LVH (left ventricular hypertrophy)     Osteoarthritis     Pancreatic mass     Protein calorie malnutrition     TIA (transient ischemic attack)      Past Surgical History:   Procedure Laterality Date    ABDOMINAL SURGERY      COLONOSCOPY      ERCP (ENDOSCOPIC RETROGRADE CHOLANGIOPANCREATOGRAPHY) N/A 2/5/2019    Performed by [facility]bert [facility] ENDO    ESOPHAGOGASTRODUODENOSCOPY      ULTRASOUND, UPPER GI TRACT, ENDOSCOPIC N/A 2/5/2019    Performed by Hebert Watson MD at [facility] ENDO     ALLERGIES [id number]  No Known Allergies    ANES-RELATED MAR [id number]  amlodipine-benazepril pantoprazole  enoxaparin  TPN  KCl-PO     Lab Results   Component Value Date    WBC 9.01 03/11/2019    HGB 13.7 (L) 03/11/2019    HCT 42.4 03/11/2019    MCV 92 03/11/2019     03/11/2019     BMP  Lab Results   Component Value Date     (L) 03/13/2019    K 4.3 03/13/2019     03/13/2019    CO2 24 03/13/2019    BUN 13 03/13/2019    CREATININE 0.8 03/13/2019    CALCIUM 10.1 03/13/2019    ANIONGAP 8 03/13/2019    ESTGFRAFRICA >60 03/13/2019    EGFRNONAA >60 03/13/2019         Pre-op Assessment    I have reviewed the Patient Summary Reports.     I have reviewed the Nursing Notes.   I  have reviewed the Medications.     Review of Systems  Anesthesia Hx:  History of prior surgery of interest to airway management or planning:  Denies Personal Hx of Anesthesia complications.   Social:  Non-Smoker, Alcohol Use    Hematology/Oncology:  Hematology Normal      Current/Recent Cancer. (colon) surgery   EENT/Dental:   Full teeth, slightly reduced mouth opening   Cardiovascular:   Hypertension hyperlipidemia ECG has been reviewed. In media, 11/4/18 SR with occ PAC and nonspecific ST-T wave abn   Pulmonary:  Pulmonary Normal Chronic bronchitis   Hepatic/GI:   GERD Pancreatic mass, obstructive jaundice; elevated liver enzymes    Difficulty with oral intake; gastric outlet obstruction questioned   Musculoskeletal:  Ankylosing Spondylitis    Neurological:   Denies CVA. Denies Seizures.    Endocrine:   Diabetes, poorly controlled      Wt Readings from Last 1 Encounters:   03/14/19 53 kg (116 lb 13.5 oz)     Temp Readings from Last 1 Encounters:   03/14/19 36.9 °C (98.5 °F) (Oral)     BP Readings from Last 1 Encounters:   03/14/19 102/64     Pulse Readings from Last 1 Encounters:   03/14/19 99     SpO2 Readings from Last 1 Encounters:   03/14/19 96%       Physical Exam  General:  Cachexia    Airway/Jaw/Neck:  Airway Findings: Mouth Opening: Small, but > 3cm Mallampati: I  TM Distance: Normal, at least 6 cm         Dental:  Dental Findings: Periodontal disease, Severe   Chest/Lungs:  Chest/Lungs Clear    Heart/Vascular:  Heart Findings: Normal       Mental Status:  Mental Status Findings:  Cooperative, Alert and Oriented       CXR  none    EKG 3/17/2019- NSR      Anesthesia Plan  Type of Anesthesia, risks & benefits discussed:  Anesthesia Type:  general  Patient's Preference:   Intra-op Monitoring Plan:   Intra-op Monitoring Plan Comments:   Post Op Pain Control Plan: per primary service following discharge from PACU  Post Op Pain Control Plan Comments:   Induction:   IV  Beta Blocker:  Patient is not currently on  a Beta-Blocker (No further documentation required).       Informed Consent: Patient understands risks and agrees with Anesthesia plan.  Questions answered. Anesthesia consent signed with patient.  ASA Score: 3     Day of Surgery Review of History & Physical:        Anesthesia Plan Notes:           Ready For Surgery From Anesthesia Perspective.

## 2019-03-15 NOTE — PLAN OF CARE
Problem: Adult Inpatient Plan of Care  Goal: Plan of Care Review  Outcome: Ongoing (interventions implemented as appropriate)  AAO x3. Bed remains low and locked call bell within reach. Bed alarm placed for pt safety. Continue to monitor.

## 2019-03-15 NOTE — TELEPHONE ENCOUNTER
----- Message from Yelitza Hernandez sent at 3/15/2019  3:33 PM CDT -----  Contact: Ana drake  DMITRI-  asked for a 3 month follow up appt. Appt was given. Just want to make sure it was not supposed to be 3 weeks. Please advise.

## 2019-03-15 NOTE — PLAN OF CARE
"VSS. No changes noted. Waiting for xray. "ok to release to room", per Dr Goldberg. Report called to pt's nurse Krishna, with time allotted for questions.   "

## 2019-03-15 NOTE — PLAN OF CARE
Problem: Adult Inpatient Plan of Care  Goal: Plan of Care Review  Outcome: Ongoing (interventions implemented as appropriate)  Morning round completed.  Patient has returned from OR having a port placed.  He reports no pain at this time.  Denies any concerns, questions or needs.  Will continue to monitor.

## 2019-03-15 NOTE — PLAN OF CARE
Problem: Adult Inpatient Plan of Care  Goal: Plan of Care Review  Outcome: Ongoing (interventions implemented as appropriate)  Pt in NAD. AAOx4. VSS. No c/o pain. TPN running. Port a cath to be placed today, 3/15. NPO status maintained starting at midnight. Safety maintained. Will continue to monitor.

## 2019-03-15 NOTE — OP NOTE
[facility]  Surgery Department  Operative Note    SUMMARY     Date of Procedure: 3/15/2019     Procedure: 1. intraop US neck -personally interpretaed  2. fluro less than one hour: interpreted images and made decision based on that   3. Power port thru Rt jugular approach tunnelled    Surgeon(s) and Role:     * Kelli Bennett MD - Primary    Assisting Surgeon:Angus rodriguez    Pre-Operative Diagnosis: Malignant neoplasm of head of pancreas [C25.0]    Post-Operative Diagnosis: Post-Op Diagnosis Codes:     * Malignant neoplasm of head of pancreas [C25.0]    Anesthesia: General    Technical Procedures Used: seldinger technique with US and fluoro guidance    Description of the Findings of the Procedure: patent RT IJ vein    [id number]    Complications: no  Estimated Blood Loss (EBL): minimal           Implants:   Implant Name Type Inv. Item Serial No.  Lot No. LRB No. Used   PORT POWER CLEAR VIEW - [id number]  PORT POWER CLEAR VIEW  [employer] [id number] 1       Specimens:   Specimen (12h ago, onward)    None                  Condition: Stable    Disposition: PACU - hemodynamically stable.    Attestation: I was present and scrubbed for the entire procedure.

## 2019-03-15 NOTE — PLAN OF CARE
Discharge rounds on patient. Discussed followup appointments, blue discharge folder, discharge nurse will go over home medications and reasons for medications and final discharge instructions. All patient/caregiver questions answered. Patient verbalized understanding.    TN met with patient and informed on discharge today. Pt has multiple family members here to bring him home. Transport coming to get pt to bring him down. Pt informed on follow up appointments. He has no further questions.    Future Appointments   Date Time Provider Department Center   3/19/2019  3:00 PM Tate Arteaga MD [facility] HEM [facility]   6/13/2019 11:00 AM Kelli Bennett MD [facility] T[facility]        03/15/19 1641   Final Note   Assessment Type Final Discharge Note   Anticipated Discharge Disposition Home   What phone number can be called within the next 1-3 days to see how you are doing after discharge? [phone]   Hospital Follow Up  Appt(s) scheduled? Yes   Discharge plans and expectations educations in teach back method with documentation complete? Yes   Right Care Referral Info   Post Acute Recommendation Home-care   Referral Type Home

## 2019-03-15 NOTE — PLAN OF CARE
VSS. Denies pain or discomfort @ this time. Unable to locate family for update. Yasmany in radiology notified of need for stat chest xray. Cont to monitor.

## 2019-03-15 NOTE — PROGRESS NOTES
TUMOR BOARD:    72 y/o , prior history of colon cancer 12 years ago.  New onset jaundice 40 lb weight loss.  Found to have adenocarcinoma head of the pancreas, status post biliary stent placement and EUS, biopsy-proven.    CA 19-9 greater than 3000.    Recommendations:  Med[facility]  Chemotherapy  Re-stage after 4 cycles with CA 19 9 and cross-sectional imaging.

## 2019-03-15 NOTE — DISCHARGE SUMMARY
[facility] General Surgery  Neuroendocrine Tumor Service  Discharge Summary      Patient Name: Chema Lawler  MRN: [id number]  Admission Date: 3/11/2019  Hospital Length of Stay: 4 days  Discharge Date and Time:  03/15/2019 3:25 PM  Attending Physician: Kelli Bennett MD   Discharging Provider: Angus Phillips MD  Primary Care Provider: Maximino Alicea MD     HPI: 73-year-old male that was referred to Dr. Pereira's office for evaluation for pancreatic mass.  Initially diagnosed in [place] and was referred to Dr. Hebert Watson for EUS, where he was found to have a 47 mm x 44 mm hypoechoic pancreatic head mass with poorly-defined endosonographic borders, and sonographic evidence suggesting invasion into the SMA.  There was ductal dilatation with a maximum duct diameter of 8 mm.  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  By way of symptoms, he has experienced approximately 150 pounds in the past 6 months and complains of abdominal pain with emesis after eating, along with fatigue and diarrhea.  Patient also tells me he had a previous abdominal surgery in 2007 where he had some bowel removed for a cancer, which is likely colon, but I do not have those records at this time.  Of note, he has previously undergone ERCP with stent placement to help alleviate some symptoms.     Procedure(s) (LRB):  [id number] (Right)     Hospital Course: He was admitted to the surgery service, labs including CMP, prealbumin, CA 19-9 were ordered, started on TPN, and gastroenterology and oncology were consulted.  In addition, a contrast study was ordered to evaluate for gastric outlet obstruction, but the gastric outlet was not included in the study.  Additionally, a CT abdomen/pelvis was ordered which didn't offer much in terms of changes in management.  Gastroenterology performed an EGD and found mild extrinsic compression in the antrum, but no evidence of luminal  obstruction.  We placed a mediport prior to discharge so that he can follow-up with oncology, Dr. Das, at [facility], for neoadjuvant chemotherapy.  He is given prescriptions for Megace, Reglan, Periactin, Miralax, and pain control, and will need to follow-up with Dr. Pereira in 3 months.     Consults:   Consults (From admission, onward)        Status Ordering Provider     Inpatient consult to Hematology/Oncology  Once     Provider:  Tate Arteaga MD    Completed KELLI BENNETT     Inpatient consult to Social Work  Once     Provider:  (Not yet assigned)    Acknowledged KELLI BENNETT        Pending Diagnostic Studies:     None        Final Active Diagnoses:    Diagnosis Date Noted POA    PRINCIPAL PROBLEM:  Malignant neoplasm of head of pancreas [C25.0] 03/11/2019 Yes    Severe malnutrition [E43] 03/12/2019 Yes    Gastric outlet obstruction [K31.1] 03/11/2019 Yes      Problems Resolved During this Admission:    Diagnosis Date Noted Date Resolved POA    Nausea and vomiting [R11.2]  03/15/2019 Yes      Discharged Condition: stable    Disposition: Home or Self Care    Follow Up:  Follow-up Information     Kelli Bennett MD. Schedule an appointment as soon as possible for a visit in 3 months.    Specialty:  General Surgery  Contact information:  [address]  [address]  [facility] [address] [phone]                 Patient Instructions:      Ambulatory Referral to Oncology   Referral Priority: Routine Referral Type: Consultation   Referral Reason: Specialty Services Required   Requested Specialty: Oncology   Number of Visits Requested: 1     Diet Adult Regular     No driving until:   Order Comments: Off narcotics     Notify your health care provider if you experience any of the following:  temperature >100.4     Notify your health care provider if you experience any of the following:  persistent nausea and vomiting or diarrhea     Notify your health care provider if you  experience any of the following:  severe uncontrolled pain     Notify your health care provider if you experience any of the following:  redness, tenderness, or signs of infection (pain, swelling, redness, odor or green/yellow discharge around incision site)     No dressing needed     Shower on day dressing removed (No bath)     Medications:  Reconciled Home Medications:      Medication List      START taking these medications    dicyclomine 10 MG capsule  Commonly known as:  BENTYL  Take 1 capsule (10 mg total) by mouth 4 (four) times daily before meals and nightly.     megestrol 40 MG Tab  Commonly known as:  MEGACE  Take 1 tablet (40 mg total) by mouth once daily.     metoclopramide HCl 10 MG tablet  Commonly known as:  REGLAN  Take 1 tablet (10 mg total) by mouth 4 (four) times daily.     polyethylene glycol 17 gram Pwpk  Commonly known as:  GLYCOLAX  Take 17 g by mouth 2 (two) times daily.     traMADol 50 mg tablet  Commonly known as:  ULTRAM  Take 1 tablet (50 mg total) by mouth every 6 (six) hours as needed for Pain.        CONTINUE taking these medications    alendronate 35 MG tablet  Commonly known as:  FOSAMAX  TAKE ONE TABLET week     amlodipine-benazepril 10-40 mg per capsule  Commonly known as:  LOTREL  Take 1 capsule by mouth once daily.     b complex vitamins tablet  Take 1 tablet by mouth once daily.     chlorproMAZINE 50 MG tablet  Commonly known as:  THORAZINE  TAKE ONE TABLET 3 TIMES DAILY AS NEEDED iccups     cyanocobalamin 1000 MCG tablet  Commonly known as:  VITAMIN B-12  Take 100 mcg by mouth once daily.     cyproheptadine 4 mg tablet  Commonly known as:  PERIACTIN  TAKE ONE TABLET 3 TIMES DAILY     * docusate sodium 100 MG capsule  Commonly known as:  COLACE  Take 100 mg by mouth 2 (two) times daily.     *  MG capsule  Generic drug:  docusate sodium  TK 1 C PO BID     ergocalciferol 50,000 unit Cap  Commonly known as:  ERGOCALCIFEROL     FREESTYLE LANCETS 28 gauge Misc  Generic drug:   "lancets  TEST UP TO TID     FREESTYLE LITE METER kit  Generic drug:  blood-glucose meter  TEST UP TO TID.     FREESTYLE LITE STRIPS Strp  Generic drug:  blood sugar diagnostic  TEST UP TO TID     HumaLOG U-100 Insulin 100 unit/mL injection  Generic drug:  insulin lispro  FOLLOW SLIDING SCALE ON SHEET UP TO 36 UNITS IN A DAY     insulin syringe-needle U-100 0.3 mL 31 gauge x 5/16" Syrg     magnesium oxide 400 mg (241.3 mg magnesium) tablet  Commonly known as:  MAG-OX  Take 400 mg by mouth 2 (two) times daily.     metFORMIN 500 MG tablet  Commonly known as:  GLUCOPHAGE  TAKE ONE TABLET 2 TIMES DAILY     ondansetron 8 MG Tbdl  Commonly known as:  ZOFRAN-ODT  TAKE ONE TABLET 3 TIMES DAILY AS NEEDED     pantoprazole 40 MG tablet  Commonly known as:  PROTONIX  TK 1 T PO  Q 12 H     PLAVIX ORAL  Take 75 mg by mouth once daily.     potassium chloride 10 MEQ Tbsr  Commonly known as:  KLOR-CON  TK 1 T PO QD WITH BREAKFAST     promethazine 25 MG tablet  Commonly known as:  PHENERGAN  Take 25 mg by mouth every 6 (six) hours as needed for Nausea.     simvastatin 20 MG tablet  Commonly known as:  ZOCOR  Take 20 mg by mouth nightly.     SITagliptin 100 MG Tab  Commonly known as:  JANUVIA  Take 100 mg by mouth once daily.     vitamin A 82209 UNIT capsule  Take 10,000 Units by mouth once daily.     VITAMIN C 1000 MG tablet  Generic drug:  ascorbic acid (vitamin C)  Take 1,000 mg by mouth once daily.         * This list has 2 medication(s) that are the same as other medications prescribed for you. Read the directions carefully, and ask your doctor or other care provider to review them with you.                Angus Phillips MD  General Surgery  Neuroendocrine Tumor Service  [facility]  "

## 2019-03-15 NOTE — PROGRESS NOTES
"[facility]  Adult Nutrition  Progress Note    SUMMARY       Recommendations    Recommendation:   1. Encourage intake at meals as tolerated.   2. Change diet to 1800 ADA and add Boost Glucose Control bid.    Goals:   Meet > 85% EEN daily  Nutrition Goal Status: progressing towards goal  Communication of RD Recs: reviewed with RN    Reason for Assessment  Reason For Assessment: RD follow-up  Diagnosis: (Pancreatic Cancer)  Relevant Medical History: DM, HTN, HLD, Cachexia  Interdisciplinary Rounds: did not attend  General Information Comments: Pt on Regular diet. s/p EGD yesterday and port a cath placement today. TPN d/c. NFPE completed 3/12/19. TPN d/c  Nutrition Discharge Planning: Too soon to determine    Nutrition Risk Screen  Nutrition Risk Screen: no indicators present    Nutrition/Diet History  Patient Reported Diet/Restrictions/Preferences: pureed  Spiritual, Cultural Beliefs, Evangelical Practices, Values that Affect Care: no  Supplemental Drinks or Food Habits: (Equate bid)  Factors Affecting Nutritional Intake: None identified at this time    Anthropometrics  Temp: 98.1 °F (36.7 °C)  Height Method: Stated  Height: 5' 6" (167.6 cm)  Height (inches): 66 in  Weight Method: Bed Scale  Weight: 54.2 kg (119 lb 7.8 oz)  Weight (lb): 119.49 lb  Ideal Body Weight (IBW), Male: 142 lb  % Ideal Body Weight, Male (lb): 80.27 lb  BMI (Calculated): 18.4  BMI Grade: 17 - 18.4 protein-energy malnutrition grade I  Weight Loss: unintentional  Usual Body Weight (UBW), k kg(no prev wt records)  % Usual Body Weight: 43.54  % Weight Change From Usual Weight: -56.55 %     Lab/Procedures/Meds  Pertinent Labs Reviewed: reviewed  Pertinent Labs Comments: Na 135L, Glu 231H, PAB 18L  Pertinent Medications Reviewed: reviewed  Pertinent Medications Comments: Vit C, Reglan, renal vitamin    Estimated/Assessed Needs  Weight Used For Calorie Calculations: 54.2 kg (119 lb 7.8 oz)  Energy Calorie Requirements (kcal): " 3975-9597 (30 kcal/kg)  Energy Need Method: Kcal/kg  Protein Requirements: 65-76g (1.2-1.4g/kg)  Weight Used For Protein Calculations: 54.2 kg (119 lb 7.8 oz)  Estimated Fluid Requirement Method: RDA Method  RDA Method (mL): 1623  CHO Requirement: 200g    Nutrition Prescription Ordered  Current Diet Order: Regular    Evaluation of Received Nutrient/Fluid Intake  I/O: reviewed  Energy Calories Required: not meeting needs  Protein Required: not meeting needs  Fluid Required: not meeting needs  Comments: LBM 3/8  Tolerance: (NPO)  % Intake of Estimated Energy Needs: 25 - 50 %  % Meal Intake: 25 - 50 %    Nutrition Risk  Level of Risk/Frequency of Follow-up: (2xweekly)     Assessment and Plan   Malnutrition in the context of Chronic Illness/Injury     Related to (etiology):  Altered GI Function/ Pancreatic Cancer     Signs and Symptoms (as evidenced by):     Body Fat Depletion: moderate depletion of orbitals, triceps and thoracic and lumbar region   Muscle Mass Depletion: moderate and severe depletion of temples, clavicle region, scapular region, interosseous muscle and lower extremities   Weight Loss: 50% x 6 months         Interventions  Collaboration with providers     Nutrition Diagnosis Status:  New      Monitor and Evaluation  Food and Nutrient Intake: energy intake, parenteral nutrition intake, food and beverage intake  Food and Nutrient Adminstration: diet order, enteral and parenteral nutrition administration  Knowledge/Beliefs/Attitudes: food and nutrition knowledge/skill  Physical Activity and Function: nutrition-related ADLs and IADLs  Anthropometric Measurements: weight, weight change  Biochemical Data, Medical Tests and Procedures: electrolyte and renal panel, glucose/endocrine profile  Nutrition-Focused Physical Findings: overall appearance     Malnutrition Assessment  Orbital Region (Subcutaneous Fat Loss): moderate depletion  Upper Arm Region (Subcutaneous Fat Loss): moderate depletion  Thoracic and  Lumbar Region: moderate depletion   Christianity Region (Muscle Loss): moderate depletion  Clavicle Bone Region (Muscle Loss): moderate depletion  Clavicle and Acromion Bone Region (Muscle Loss): moderate depletion  Scapular Bone Region (Muscle Loss): moderate depletion  Dorsal Hand (Muscle Loss): moderate depletion  Patellar Region (Muscle Loss): severe depletion  Anterior Thigh Region (Muscle Loss): severe depletion  Posterior Calf Region (Muscle Loss): severe depletion   Subcutaneous Fat Loss (Final Summary): moderate protein-calorie malnutrition  Muscle Loss Evaluation (Final Summary): severe protein-calorie malnutrition    Severe Weight Loss (Malnutrition): greater than 10% in 6 months    Nutrition Follow-Up  RD Follow-up?: Yes

## 2019-03-15 NOTE — TRANSFER OF CARE
"Anesthesia Transfer of Care Note    Patient: Chema Lawler    Procedure(s) Performed: Procedure(s) (LRB):  [id number] (Right)    Patient location: PACU    Anesthesia Type: MAC    Transport from OR: Transported from OR on room air with adequate spontaneous ventilation    Post pain: adequate analgesia    Post assessment: no apparent anesthetic complications    Post vital signs: stable    Level of consciousness: awake and alert    Nausea/Vomiting: no nausea/vomiting    Complications: none    Transfer of care protocol was followed      Last vitals:   Visit Vitals  /71 (BP Location: Left arm, Patient Position: Lying)   Pulse 93   Temp 36.9 °C (98.4 °F) (Oral)   Resp 16   Ht 5' 6" (1.676 m)   Wt 54.2 kg (119 lb 7.8 oz)   SpO2 96%   BMI 19.29 kg/m²     "

## 2019-03-15 NOTE — ANESTHESIA POSTPROCEDURE EVALUATION
"Anesthesia Post Evaluation    Patient: Chema Lawler    Procedure(s) Performed: Procedure(s) (LRB):  [id number] (Right)    Final Anesthesia Type: MAC  Patient location during evaluation: OPS  Patient participation: Yes- Able to Participate  Level of consciousness: awake and alert and oriented  Post-procedure vital signs: reviewed and stable  Pain management: adequate  Airway patency: patent  PONV status at discharge: No PONV  Anesthetic complications: no      Cardiovascular status: blood pressure returned to baseline  Respiratory status: unassisted  Hydration status: euvolemic  Follow-up not needed.        Visit Vitals  /78   Pulse 80   Temp 36.6 °C (97.9 °F) (Temporal)   Resp 12   Ht 5' 6" (1.676 m)   Wt 54.2 kg (119 lb 7.8 oz)   SpO2 100%   BMI 19.29 kg/m²       Pain/Low Score: Low Score: 9 (3/15/2019  9:00 AM)        "

## 2019-03-15 NOTE — NURSING
Patient discharged per MD orders. PIV discontinued per MD orders. Catheter tip intact and pressure dressing applied. Patient and family verbalized understanding of discharge instructions and discharge medications. Transport placed for patient to be wheeled down star

## 2019-03-15 NOTE — OP NOTE
DATE OF PROCEDURE:  03/15/2019.    PREOPERATIVE DIAGNOSIS:  Unresectable pancreatic cancer for neoadjuvant   treatment.    POSTOPERATIVE DIAGNOSIS:  Pancreatic cancer for neoadjuvant treatment.    PROCEDURES DONE:  1.  Intraoperative ultrasound.  2.  Fluoroscopy less than 1 hour.  Image is interpreted by me and actions based   on my interpretation.  3.  Power port placement through right internal jugular approach and tunneled   catheter placement.    ANESTHESIA:  Local MAC.    SURGEON:  Violeta Pereira M.D.    SURGERY RESIDENT:  Dr. Angus Phillips.    BLOOD LOSS:  Minimal.    The patient is stable and transferred to Recovery Room in stable condition with   no complication.    HISTORY:  This is a 73-year-old gentleman who was admitted here with the   complaints of loss of weight, loss of appetite with significant weight loss.  He   has had endoscopic ultrasound and biopsy done, which was confirmed with a   pancreatic cancer.  The vessels were all involved, so that he could not be an   operative candidate.  At this point, he is a good candidate for neoadjuvant   treatment.  The patient had nausea, vomiting and diarrhea after he was taking   oral intake.  He was evaluated, but did not have any gastric outlet obstruction.    After making sure his overall condition is okay, the decision was made and   placed the port on so that he can start chemotherapy.    PROCEDURE IN DETAIL:  The patient was brought to the Operating Room, was placed   in supine position.  He was then sedated.  The head was turned to the left side.    The right side of the neck and chest was prepped and draped in the usual   sterile manner.  Timeout was done to verify the ID of the patient, procedure and   everyone concurred.  An ultrasound was done.  Ultrasound showed patent right   internal jugular vein.  1% Xylocaine was infiltrated at the root of the neck.    With ultrasound guidance, the right internal jugular venous access was   accomplished,  through the needle, a guidewire was inserted and the position of   the guidewire was verified using fluoroscopy.    A spot on the right lateral chest wall was chosen, 1% Xylocaine was infiltrated.    An incision was made.  Subcutaneous dissection was made to fit in the port.    The dissection was anterior to the pectoral fascia.  Following this, stay   sutures using 2-0 Prolene stitch were taken on both sides and was secured to the   PowerPort.  Finally, the tunnel was made between the entrance of the port site   and the proximal tip of the catheter was brought through the tunnel.    Under fluoroscopic guidance, the dilator with the sheath was advanced over the   guidewire and the guidewire and the dilator was withdrawn.  The proximal tip of   the catheter was inserted through the sheath and the sheath was peeled off.  The   tip of the catheter was such that it was at the junction of SVC and right   atrium.  The other end of the catheter was carefully trimmed and was secured to   the PowerPort and was locked and latched securely.  Following this, the   PowerPort was placed in the reservoir of the chest wall and Prolene stitches   were secured to the chest wall.  The port site was closed in 2 layers with 3-0   Vicryl and 4-0 Monocryl.  Blood was withdrawn through the port and was flushed   through heparin saline followed by straight heparin.  The entrance site was then   closed using 4-0 Monocryl.  At the end, another fluoroscopy was done and the   catheter position was found to be satisfactory.  Dermabond was applied over the   skin incision.  Blood loss was minimal.  The patient was stable and was   transferred to Recovery Room in stable condition.      BETTY/ALY  dd: 03/15/2019 08:54:30 (CDT)  td: 03/15/2019 09:19:14 (CDT)  Doc ID   #[id number]  Job ID #[id number]    CC:

## 2019-03-15 NOTE — PROGRESS NOTES
Surgery Progress Note    S:  NAEO.  OR today for mediport placement    O:  Temp:  [97.4 °F (36.3 °C)-98.5 °F (36.9 °C)] 97.9 °F (36.6 °C)  Pulse:  [78-99] 80  Resp:  [11-19] 12  SpO2:  [93 %-100 %] 100 %  BP: (101-155)/(64-89) 130/78    Physical Exam:  Gen: no acute distress.  Alert and oriented x3  HEENT: normocephalic and atraumatic. EOMI.   Resp: unlabored respirations  CV: regular rate  Abd: soft, non-distended, non-tender to palpation  Ext: warm and well perfused  MSK: normal range of motion, normal strength  Neuro: no focal deficits, normal sensation    A/P:  73-year-old gentleman with pancreatic head adenocarcinoma, likely unresectable by imaging, admitted with malnutrition, possible gastric outlet obstruction    Afebrile, vital signs stable  OR today for mediport placement  Discharge following procedure with outpatient follow-up with Dr. Das in [place] at [facility] for neoadjuvant therapy    Angus Phillips MD  [employer] General Surgery

## 2019-03-15 NOTE — PLAN OF CARE
Problem: Oral Intake Inadequate  Goal: Improved Oral Intake  Outcome: Ongoing (interventions implemented as appropriate)  Recommendation:   1. Encourage intake at meals as tolerated.   2. Change diet to 1800 ADA and add Boost Glucose Control bid.    Goals:   Meet > 85% EEN daily  Nutrition Goal Status: progressing towards goal  Communication of RD Recs: reviewed with RN

## 2019-03-18 NOTE — PROGRESS NOTES
"PATIENT: Chema Lawler  MRN: [id number]  DATE: 3/18/2019    Diagnosis:   1. Malignant neoplasm of head of pancreas    2. Severe malnutrition    3. Type 2 diabetes mellitus with hyperglycemia, with long-term current use of insulin    4. Advance care planning      Chief Complaint: Pancreatic Cancer    Oncologic History:      Oncologic History 1. Malignant neoplasm of head of pancreas - Stage III (cT4, cN0, cM0)       Oncologic Treatment Likely neoadjuvant chemotherapy with FOLFOX followed by surgical resection if there is sufficient response (depending on his performance status)      Pathology 2/5/19:  Pancreatic head mass, fine needle aspiration:  - Positive for ductal adenocarcinoma        Subjective:    History of Present Illness: Mr. Lawler is a 73 y.o. male who presents for evaluation and management of pancreatic cancer. I had seen him during a hospitalization on 3/11/19. He was admitted on 3/11/19 - 3/15/19 for malnutrition and gastric outlet obstruction. He has the following comorbid conditions: type 2 diabetes mellitus with hyperglycemia with long-term current use of insulin (insulin was recently discontinued), severe protein malnutrition, hypertension.    - onset of symptoms was approximately 6-8 months ago. He notes loss of appetite and significant weight loss (about 120 lbs in the past 6 months).    - endoscopic ultrasound (2/5/19) revealed a mass in the head of the pancreas. It was T4 by sonographic criteria. It was biopsied; pathology was positive for ductal adenocarcinoma. It was a 47 mm x 44 mm hypoechoic pancreatic head mass with poorly-defined endosonographic borders, and sonographic evidence suggesting invasion into the SMA.  - 3/11/19: he was evaluated by surgeon Dr. Bennett. He was admitted from clinic for possible gastric outlet obstruction. He was hospitalized from 3/11/19 - 3/15/19. Per the discharge summary: "Gastroenterology performed an EGD and found mild extrinsic compression in the " "antrum, but no evidence of luminal obstruction.  We placed a mediport prior to discharge so that he can follow-up with oncology."    - today, he complains of continued poor appetite and weight loss. He denies abdominal pain. He is accompanied by several family members. They request that he receive his chemotherapy in [place], as that is closer to where they live.  - he denies headache, shortness of breath, chest pain, vomiting, diarrhea.  - from questioning, it appears that he had an abdominal surgery about 10-15 years ago for a possible cancer (colon?). He does not have more information regarding this history at this time.    Past medical, surgical, family, and social histories have been reviewed and updated below.    Past Medical History:   Past Medical History:   Diagnosis Date    Abnormal liver function test     Cachexia     Cancer     Chronic bronchitis     Dehydration     Diabetes mellitus     GERD (gastroesophageal reflux disease)     Grade I diastolic dysfunction     High cholesterol     Hypertension     Hypomagnesemia     Jaundice     Low blood potassium     LVH (left ventricular hypertrophy)     Osteoarthritis     Pancreatic mass     Protein calorie malnutrition     TIA (transient ischemic attack)        Past Surgical History:   Past Surgical History:   Procedure Laterality Date    ABDOMINAL SURGERY      COLONOSCOPY      EGD (ESOPHAGOGASTRODUODENOSCOPY) N/A 3/14/2019    Performed by [facility]ain Newsome MD at [facility]    ERCP (ENDOSCOPIC RETROGRADE CHOLANGIOPANCREATOGRAPHY) N/A 2/5/2019    Performed by [facility]bert Watson MD at [facility] ENDO    ESOPHAGOGASTRODUODENOSCOPY      [id number]-GZTY-P-CATH Right 3/15/2019    Performed by Kelli Bennett MD at [facility] OR    ULTRASOUND, UPPER GI TRACT, ENDOSCOPIC N/A 2/5/2019    Performed by Hebert Watson MD at [facility] ENDO       Family History: No family history on file.    Social History:  reports that  has never smoked. he has never used " "smokeless tobacco. He reports that he drinks alcohol. He reports that he does not use drugs.    Allergies:  Review of patient's allergies indicates:  No Known Allergies    Medications:  Current Outpatient Medications   Medication Sig Dispense Refill    alendronate (FOSAMAX) 35 MG tablet TAKE ONE TABLET week  0    amlodipine-benazepril (LOTREL) 10-40 mg per capsule Take 1 capsule by mouth once daily.  0    ascorbic acid, vitamin C, (VITAMIN C) 1000 MG tablet Take 1,000 mg by mouth once daily.      b complex vitamins tablet Take 1 tablet by mouth once daily.      chlorproMAZINE (THORAZINE) 50 MG tablet TAKE ONE TABLET 3 TIMES DAILY AS NEEDED iccups  0    clopidogrel bisulfate (PLAVIX ORAL) Take 75 mg by mouth once daily.      cyanocobalamin (VITAMIN B-12) 1000 MCG tablet Take 100 mcg by mouth once daily.      cyproheptadine (PERIACTIN) 4 mg tablet TAKE ONE TABLET 3 TIMES DAILY 90 tablet 1    dicyclomine (BENTYL) 10 MG capsule Take 1 capsule (10 mg total) by mouth 4 (four) times daily before meals and nightly. 120 capsule 0    docusate sodium (COLACE) 100 MG capsule Take 100 mg by mouth 2 (two) times daily.       mg capsule TK 1 C PO BID  1    ergocalciferol (ERGOCALCIFEROL) 50,000 unit Cap   0    FREESTYLE LANCETS 28 gauge lancets TEST UP TO TID  0    FREESTYLE LITE METER kit TEST UP TO TID.  0    FREESTYLE LITE STRIPS Strp TEST UP TO TID  0    HUMALOG U-100 INSULIN 100 unit/mL injection FOLLOW SLIDING SCALE ON SHEET UP TO 36 UNITS IN A DAY  2    insulin syringe-needle U-100 0.3 mL 31 gauge x 5/16" Syrg       magnesium oxide (MAG-OX) 400 mg (241.3 mg magnesium) tablet Take 400 mg by mouth 2 (two) times daily.      megestrol (MEGACE) 40 MG Tab Take 1 tablet (40 mg total) by mouth once daily. 30 tablet 11    metFORMIN (GLUCOPHAGE) 500 MG tablet TAKE ONE TABLET 2 TIMES DAILY  0    metoclopramide HCl (REGLAN) 10 MG tablet Take 1 tablet (10 mg total) by mouth 4 (four) times daily. 90 tablet 1 "    ondansetron (ZOFRAN-ODT) 8 MG TbDL TAKE ONE TABLET 3 TIMES DAILY AS NEEDED  0    pantoprazole (PROTONIX) 40 MG tablet TK 1 T PO  Q 12 H  0    polyethylene glycol (GLYCOLAX) 17 gram PwPk Take 17 g by mouth 2 (two) times daily. 72 each 0    potassium chloride (KLOR-CON) 10 MEQ TbSR TK 1 T PO QD WITH BREAKFAST  0    promethazine (PHENERGAN) 25 MG tablet Take 25 mg by mouth every 6 (six) hours as needed for Nausea.      simvastatin (ZOCOR) 20 MG tablet Take 20 mg by mouth nightly.  0    SITagliptin (JANUVIA) 100 MG Tab Take 100 mg by mouth once daily.      traMADol (ULTRAM) 50 mg tablet Take 1 tablet (50 mg total) by mouth every 6 (six) hours as needed for Pain. 28 tablet 0    vitamin A 76844 UNIT capsule Take 10,000 Units by mouth once daily.       No current facility-administered medications for this visit.      Facility-Administered Medications Ordered in Other Visits   Medication Dose Route Frequency Provider Last Rate Last Dose    0.9%  NaCl infusion   Intravenous Continuous Hebert Watson MD 20 mL/hr at 02/05/19 1339      sodium chloride 0.9% flush 3 mL  3 mL Intravenous PRN Hebert Watson MD           Review of Systems   Constitutional: Positive for activity change, appetite change, fatigue and unexpected weight change.   HENT: Negative for sore throat.    Eyes: Negative for visual disturbance.   Respiratory: Negative for shortness of breath.    Cardiovascular: Negative for chest pain and leg swelling.   Gastrointestinal: Negative for abdominal pain and vomiting.   Genitourinary: Negative for dysuria.   Musculoskeletal: Negative for back pain.   Skin: Negative for rash.   Neurological: Positive for weakness. Negative for headaches.   Hematological: Negative for adenopathy.   Psychiatric/Behavioral: The patient is nervous/anxious.        ECOG Performance Status:   ECOG SCORE 2       Objective:      Vitals:   Vitals:    03/19/19 1500   BP: 99/78   Pulse: (!) 116   Resp: 18   Temp: 98.3 °F (36.8 °C)    TempSrc: Oral   SpO2: 96%   Weight: 53.4 kg (117 lb 11.6 oz)     BMI: Body mass index is 19 kg/m².    Physical Exam   Constitutional: He is oriented to person, place, and time. He appears well-developed.   Moderately malnourished. Fatigued.   HENT:   Head: Normocephalic.   Temporal wasting   Eyes: EOM are normal. Pupils are equal, round, and reactive to light.   Neck: Normal range of motion. Neck supple.   Cardiovascular: Regular rhythm.   tachycardic   Pulmonary/Chest: Effort normal and breath sounds normal.   Abdominal: Soft. Bowel sounds are normal.   Musculoskeletal: Normal range of motion.   Neurological: He is alert and oriented to person, place, and time.   Skin: Skin is warm and dry.   Psychiatric: He has a normal mood and affect. His behavior is normal. Judgment and thought content normal.   Nursing note and vitals reviewed.      Laboratory Data:  Labs have been reviewed.    Lab Results   Component Value Date    WBC 9.01 03/11/2019    HGB 13.7 (L) 03/11/2019    HCT 42.4 03/11/2019    MCV 92 03/11/2019     03/11/2019     3/11/19:      Imaging:     Upper endoscopic ultrasound (2/5/19):  - Normal esophagus.  - Normal stomach.  - Biliary stent in the duodenum.  - A mass was identified in the pancreatic head. This was staged T4 N0 Mx by endosonographic criteria. The staging applies if malignancy is confirmed. Fine needle aspiration performed.  - One stent was visualized endosonographically in the common bile duct.    Assessment:       1. Malignant neoplasm of head of pancreas    2. Severe malnutrition    3. Type 2 diabetes mellitus with hyperglycemia, with long-term current use of insulin    4. Advance care planning         Plan:     1. Malignant neoplasm of head of pancreas - Stage III (cT4, cN0, cM0)   - per previous reports, he has a 47 mm x 44 mm hypoechoic pancreatic head mass with poorly-defined endosonographic borders and sonographic evidence suggesting invasion into the SMA. He has a clinical T4  lesion (stage 3 disease).  - I do not see complete staging imaging, so I have ordered a CT chest/abdomen/pelvis  - his performance status is borderline (ECOG 2). I discussed the idea of neoadjuvant therapy. I stated that I am concerned about his performance status, and I am concerned that neoadjuvant therapy may not be effective enough to make him become a surgical candidate (he is currently oyxresizyz-ikvqrdgbfe-pc-unresectable). His CA 19-9 is very elevated, which is concerning for possibly more extensive disease.  - regarding a possible chemotherapy regimen, I do not think he could tolerate FOLFIRINOX. I recommend FOLFOX.  - The risks and benefits of chemotherapy were discussed, written information was given, and informed consent was obtained.  - [After the patient and his family left, I plugged his information into [facility] pathway. With his performance, he is not a candidate for FOLFIRINOX. [facility] recommended gemcitabine/abraxane. I accepted this treatment plan. This different chemotherapy regimen will need to be discussed and consented for by his [facility] oncologist.]  - I will refer him to an oncologist on the [place]. Recommend that he see this provider within 1-2 weeks. We will get his CT scans scheduled in the next week.  - he will remain connected with surgeon Dr. Bennett.   - I answered all his and his family's questions to their satisfaction    2. Severe malnutrition  - I encouraged him to drink protein shakes, basically do whatever he can to maintain his weight.  - he has an appetite stimulant in his medication list  - continue to monitor    3. Type 2 diabetes with hyperglycemia with long-term current use of insulin  - he recently discontinued insulin  - monitor his blood glucose closely during chemotherapy    - I will refer him to an oncologist on the [place]. Recommend that he see this provider within 1-2 weeks. We will get his CT scans scheduled in the next week.    Tate  LEONOR Arteaga.  Hematology/Oncology  [facility] - [address], [address]  [place] [address]  Phone: [phone]  Fax: [phone]

## 2019-03-18 NOTE — TELEPHONE ENCOUNTER
Returned pt. Call. Unable to leave message.    ----- Message from Sherley Stover sent at 3/18/2019  9:05 AM CDT -----  Contact: [phone]/self  Patient called to [facility] appt sooner in day if possible. Please call and advise.

## 2019-03-19 PROBLEM — Z79.4 TYPE 2 DIABETES MELLITUS WITH HYPERGLYCEMIA, WITH LONG-TERM CURRENT USE OF INSULIN: Status: ACTIVE | Noted: 2019-01-01

## 2019-03-19 PROBLEM — E11.65 TYPE 2 DIABETES MELLITUS WITH HYPERGLYCEMIA, WITH LONG-TERM CURRENT USE OF INSULIN: Status: ACTIVE | Noted: 2019-01-01

## 2019-03-19 NOTE — LETTER
March 19, 2019      Kelli Bennett MD  [address]  [address]  [facility] [address]           [facility]  [address]  [place] [address]  Phone: [phone]          Patient: Chema Lawler   MR Number: [id number]   YOB: 1945   Date of Visit: 3/19/2019       Dear :    Thank you for referring Chema Lawler to me for evaluation. Attached you will find relevant portions of my assessment and plan of care.    If you have questions, please do not hesitate to call me. I look forward to following Chema Lawler along with you.    Sincerely,    Tate Arteaga MD    Enclosure  CC:  No Recipients    If you would like to receive this communication electronically, please contact [email or web address] or [phone] to request more information on [email or web address] Link access.    For providers and/or their staff who would like to refer a patient to [facility], please contact us through our one-stop-shop provider referral line, Elisa Pearson, at [phone].    If you feel you have received this communication in error or would no longer like to receive these types of communications, please e-mail [email or web address]

## 2019-03-20 NOTE — PLAN OF CARE
START ON PATHWAY REGIMEN - Pancreatic    [id number]        Nab-paclitaxel (protein bound) (Abraxane(R))       Gemcitabine (Gemzar(R))           Additional Orders: Hold therapy and notify physician if ANC < 1500.    **Always confirm dose/schedule in your pharmacy ordering system**        Patient Characteristics:  Adenocarcinoma, Borderline Resectable or High Risk, Potentially Resectable,   Primary Neoadjuvant Therapy, PS ? 2  Histology: Adenocarcinoma  Current evidence of distant metastases<= No  AJCC T Category: T4  AJCC N Category: N0  AJCC M Category: M0  AJCC 8 Stage Grouping: III  Intent of Therapy:  Curative Intent, Discussed with Patient

## 2019-03-21 NOTE — TELEPHONE ENCOUNTER
Attempted to notify patient of contact information on [facility]. Unable to leave message due to busy signal.

## 2019-04-01 NOTE — TELEPHONE ENCOUNTER
Spoke with wife.  Schedule labs and appt for patient 4/8.  Informed wife that I would call if anything opened sooner, wife verbalized understanding.

## 2019-04-02 NOTE — LETTER
April 2, 2019        Tate Arteaga MD  [address] [facility] [address]             [facility] [address]  [facility] [address]  Phone: [phone]  Fax: [phone]   Patient: Chema Lawler   MR Number: [id number]   YOB: 1945   Date of Visit: 4/2/2019       Dear Dr. Arteaga:    Thank you for referring Chema Lawler to me for evaluation of potentially resectable pancreatic carcinoma. Below are the relevant portions of my assessment and plan of care.  If you have questions, please do not hesitate to call me. I look forward to following Chema along with you.    Sincerely,      MD SANJANA Baker MD Ramcharan Thiagarajan, MD

## 2019-04-02 NOTE — PROGRESS NOTES
Chief complaint:  Adenocarcinoma of the pancreas    History of present illness:  Patient is a 73-year-old gentleman with a prior history of small bowel carcinoma which resected, radiated, and treated with 6 months of unknown adjuvant chemotherapy in 2007 - 2008.  The patient recently presented with obstructive jaundice required implantation of a biliary stent.  He has biopsy-proven adenocarcinoma of the head of the pancreas.  The patient's primary tumor appears to abut size.  Mesenteric vein and superior mesenteric artery but does not encase them.  There is no evidence of liver or other distant metastasis.  Patient has been seen and evaluated by my colleagues in [place] and recommended for a trial of neoadjuvant chemotherapy consisting of Gemzar/Abraxane in light of his relatively poor performance status and recommendations of the via software.  Patient already has a MediPort catheter in place.  He wishes to receive chemotherapy in the [place] as he resides in [place].  No other complaints for pertinent findings on a 14 point review of systems.    Past medical history:  1.  Small-bowel carcinoma as detailed above  2.  Protein/calorie malnutrition  3.  Gastric outlet obstruction  4.  Type 2 diabetes mellitus with long-term insulin use  5.  Hypertension  6.  Peptic ulcer disease    Allergies:  None known    Medications:  1.  Bentyl 10 mg a.c. and HS  2.  Colace 100 mg twice daily  3.  Megace 40 mg daily  4.  Glucophage 500 mg twice daily with meals  5.  Zofran 8 mg as needed for nausea and emesis  6.  Protonix 40 mg daily  7.  Glycolax 17 g twice daily    Family/social history:  Patient is a never smoker.  Alcohol use is social.  No reported family history of malignancy.    Physical examination:  The patient is a well-developed, thin, frail-appearing, black gentleman, with a weight of 115.5 lb.  VITAL SIGNS: Documented  and reviewed this visit.  HEENT: Normocephalic, atraumatic. Oral mucosa pink and moist. Lips  without   lesions. Tongue midline. Oropharynx clear. Nonicteric sclerae.   NECK: Supple, no adenopathy. No carotid bruits, thyromegaly or thyroid nodule.   HEART: Regular rate and rhythm without murmur, gallop or rub.   LUNGS: Clear to auscultation bilaterally. Normal respiratory effort.   ABDOMEN: Soft, nontender, nondistended with positive normoactive bowel sounds,   no hepatosplenomegaly.   EXTREMITIES: No cyanosis, clubbing or edema. Distal pulses are intact.   AXILLAE AND GROIN: No palpable pathologic lymphadenopathy is appreciated.   SKIN: Intact/turgor normal   NEUROLOGIC: Cranial nerves II-XII grossly intact. Motor:  Loss of muscle bulk and   tone. Strength/sensory 5/5 throughout. Gait unsteady/patient ambulates with a cane.     Laboratory:  White count 8.8, hemoglobin 14.7, hematocrit 45, platelets 266, absolute neutrophil count 6200.  Sodium 141, potassium 3.6, chloride 102, CO2 27, BUN 11, creatinine 0.7, glucose 120, calcium 10.2, magnesium 1.4, liver function test within normal limits, , GFR greater than 60.  CA 19-9 is elevated at 3354.    Impression:  1.  Stage III adenocarcinoma of the pancreas.  2.  Obstructive jaundice-status post stent placement.  3.  Protein/calorie malnutrition.  4.  Marginal performance status (KPS 60%).    Plan:  1.  Initiate a trial of neoadjuvant therapy which will consist of Abraxane 125 milligram/meter squared (190 mg) IV daily on days 1, 8, and 15; Gemzar 1000 milligram/meter squared (1520 mg) IV daily on days 1, 8, and 15.  2.  Patient will receive 1 mg of Kytril prior to each days chemotherapy for control of acute emesis and as needed Compazine for control of delayed emesis.  3.  Continue Megace for stimulation of appetite.  4.  Return to clinic 1 week following initiation of therapy with interval CBC, CMP, and magnesium.  5.  Consultations will be placed for financial counselor, dietitian, and .  6.  Schedule chemotherapy teaching.  7.  40 min of  contact time was spent counseling concerning diagnosis, stage disease, rationale for neoadjuvant therapy, and I neoplastic/supportive care meds to be employed therapy, aspects of their administration, potential side effects associated with therapy, interventions for the side effects, etc.  The patient and his wife voice understanding and wish to proceed as above.

## 2019-04-04 NOTE — PROGRESS NOTES
[4/4/2019 2:29 PM]  Arlene Gomez:    Zeina, [id number] - I am doing chemo class 4/9 can he start treatment same day?    [4/4/2019 3:05 PM]    [facility] [id number] zeina can't do into thursday 04/11/19 @12:30      [4/4/2019 3:20 PM]  Arlene Gomez:    ok  ty

## 2019-04-09 NOTE — PROGRESS NOTES
Patient and wife here for chemotherapy education. Discussed possible side effects with self-care tips. Provided copies of [facility] information.  Patient was allowed time to voice questions and concerns. All were answered and patient verbalized understanding.     Patient does have prescriptions for anti-nausea medications. Instructed on how to take.   Reviewed time to be here on 4/16 @ 2:30p. Voiced understanding.  Consent signed and witnessed.

## 2019-04-10 NOTE — PROGRESS NOTES
Nutrition: Met with patient and patients family on 4/9/2019 to for chemo new patient education. Will follow up with patient once hebegins. Madisyn Rae,MS, RD, LDN

## 2019-04-15 NOTE — PROGRESS NOTES
Pharmacy Treatment Plan Review    Patient  Chema Lawler, 73 y.o.  1945    Indication: Pancreatic Cancer     History:  -prior history of small bowel carcinoma which was resected, radiated, and treated with 6 months of adjuvant chemotherapy in 2007 - 2008.    -Diagnosed with pancreatic cancer recently after admission to the hospital.      Labs:  CBC  Lab Results   Component Value Date    WBC 8.83 04/02/2019    HGB 14.7 04/02/2019    HCT 45.0 04/02/2019    MCV 92 04/02/2019     04/02/2019       BMP  Lab Results   Component Value Date     04/02/2019    K 3.6 04/02/2019     04/02/2019    CO2 27 04/02/2019    BUN 11 04/02/2019    CREATININE 0.72 04/02/2019    CALCIUM 10.2 04/02/2019    ANIONGAP 12 04/02/2019    ESTGFRAFRICA >60 04/02/2019    EGFRNONAA >60 04/02/2019       LFTs  Lab Results   Component Value Date    ALT 10 04/02/2019    AST 22 04/02/2019    ALKPHOS 112 04/02/2019    BILITOT 1.2 04/02/2019       The patient is scheduled to start the following infusion:   OP PANC NAB-PACLITAXEL + GEMCITABINE Q4W    28-day cycle for 4 cycles of:  Chemotherapy:   -PACLitaxel-protein bound (ABRAXANE) 125 mg/m2 = 190 mg in 38 mL infusion, Intravenous, on days 1, 8, 15    -gemcitabine (GEMZAR) 1,520 mg in sodium chloride 0.9% 250 mL chemo infusion, intravenous, on days 1, 8, 15    Premeds  -Granisetron 1mg IV    The treatment plan matches NCCN template

## 2019-04-16 NOTE — PLAN OF CARE
Problem: Adult Inpatient Plan of Care  Goal: Plan of Care Review  Outcome: Ongoing (interventions implemented as appropriate)  Plan of care reviewed with patient; patient verbalizes understanding. Abraxane and Gemzar side effects reviewed with pt by pharmacist. Medications reviewed and administered as ordered. No s/s of infusion reaction noted. VSS. NADN.

## 2019-04-16 NOTE — TELEPHONE ENCOUNTER
Trang from infusion sent IM that pt's bp was elevated.  V/S bp 160/104, hr 104 O2 96%, and Temp 97.9.  Dr. Garsia notified, and ordered 0.5 mg ativan ivp x1.  Trang in infusion notified, and verbalized understanding of orders.

## 2019-04-23 PROBLEM — R63.6 UNDERWEIGHT: Status: ACTIVE | Noted: 2019-01-01

## 2019-04-23 NOTE — PROGRESS NOTES
Met with pt's spouse. Discussed support services offered by the cancer center. Discussed financial distress. Pt eligible for gas card. Provided card. Provided emotional support, encouraged self-care. MARCEL JuneW, OSW-C

## 2019-04-23 NOTE — PLAN OF CARE
Problem: Adult Inpatient Plan of Care  Goal: Plan of Care Review  Outcome: Ongoing (interventions implemented as appropriate)  Pt tolerated treatment. No complaints. All questions were answered.    Problem: Fall Injury Risk  Goal: Absence of Fall and Fall-Related Injury  Outcome: Ongoing (interventions implemented as appropriate)  No complaints of falls. Pt utilizes a quad cane for stability.

## 2019-04-23 NOTE — PROGRESS NOTES
"Chief complaint:  Adenocarcinoma of the pancreas    History of present illness:  Patient is a 73-year-old thin, frail, black gentleman known to Dr. 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  The patient recently presented with obstructive jaundice required implantation of a biliary stent.  He has biopsy-proven adenocarcinoma of the head of the pancreas. There is no evidence of liver or other distant metastasis.  Patient has been placed on a trial of neoadjuvant chemotherapy consisting of Gemzar/Abraxane in light of his relatively poor performance status and recommendations of the via software.  Patient received his first dose, Cycle 1, Day 1 on 04/16/19.      He presents to the clinic today with his wife "of 42 years" for evaluation prior to Cycle 1, Day 8 of treatment.  He denies any difficulties with fevers, chills, abdominal discomfort, diarrhea, constipation, arthralgias, myalgias, peripheral neuropathy, skin rashes, mouth sores, etc.  His appetite remains poor even with Megace.  Instructed on the need to maintain good daily hydration and a diet with calories to sustain.  Patient & wife understand that "perform status" needs to be maintained.  Wife to incorporate protein shakes and small, frequent meals.  No other complaints for pertinent findings on a 14 point review of systems.       Malignant neoplasm of head of pancreas    3/11/2019 Initial Diagnosis     Malignant neoplasm of head of pancreas         4/16/2019 -  Chemotherapy     Treatment Summary   Plan Name: OP PANC NAB-PACLITAXEL + GEMCITABINE Q4W  Treatment Goal: Curative  Status: Active  Start Date: 4/16/2019  End Date: 7/23/2019 (Planned)  Provider: Cristhian Garsia MD  Chemotherapy: PACLitaxel-protein bound (ABRAXANE) 125 mg/m2 = 190 mg in 38 mL infusion, 125 mg/m2 = 190 mg, Intravenous, Clinic/HOD 1 time, 1 of 4 cycles  Administration: " 190 mg (4/16/2019)  gemcitabine (GEMZAR) 1,520 mg in sodium chloride 0.9% 250 mL chemo infusion, 1,000 mg/m2 = 1,520 mg, Intravenous, Clinic/HOD 1 time, 1 of 4 cycles  Administration: 1,520 mg (4/16/2019)          Physical examination:  GENERAL:  Well-developed, thin, frail-appearing, black gentleman in NAD.  Alert & oriented x 3.  Use of a pronged cane for ambulation.  VITAL SIGNS: Weight:  Loss of 5 1/2 pounds in 3 weeks  Wt Readings from Last 3 Encounters:   04/23/19 49.9 kg (110 lb 0.2 oz)   04/23/19 49.9 kg (110 lb 0.2 oz)   04/16/19 50.4 kg (111 lb 1.8 oz)     Temp Readings from Last 3 Encounters:   04/23/19 97.5 °F (36.4 °C)   04/16/19 97.9 °F (36.6 °C)   04/02/19 98.4 °F (36.9 °C)     BP Readings from Last 3 Encounters:   04/23/19 113/81   04/16/19 (!) 140/97   04/02/19 (!) 154/100     Pulse Readings from Last 3 Encounters:   04/23/19 98   04/16/19 93   04/02/19 109     HEENT: Normocephalic, atraumatic. Oral mucosa pink and moist. Lips without   lesions. Tongue midline. Oropharynx clear. Nonicteric sclerae.   NECK: Supple, no adenopathy. No carotid bruits, thyromegaly or thyroid nodule.   HEART: Regular rate and rhythm without murmur, gallop or rub.   LUNGS: Clear to auscultation bilaterally. Normal respiratory effort.   ABDOMEN: Soft, nontender, nondistended with positive normoactive bowel sounds,   no hepatosplenomegaly.   EXTREMITIES: No cyanosis, clubbing or edema. Distal pulses are intact.   AXILLAE AND GROIN: No palpable pathologic lymphadenopathy is appreciated.   SKIN: Intact/turgor normal   NEUROLOGIC: Cranial nerves II-XII grossly intact. Motor:  Loss of muscle bulk and   tone. Strength/sensory 5/5 throughout. Gait unsteady/patient ambulates with a cane.     Laboratory:  Lab Results   Component Value Date    WBC 4.48 04/23/2019    HGB 14.4 04/23/2019    HCT 44.3 04/23/2019    MCV 92 04/23/2019     04/23/2019     Unremarkable differential    CMP  Sodium   Date Value Ref Range Status    04/23/2019 137 136 - 145 mmol/L Final     Potassium   Date Value Ref Range Status   04/23/2019 3.9 3.5 - 5.1 mmol/L Final     Chloride   Date Value Ref Range Status   04/23/2019 102 95 - 110 mmol/L Final     CO2   Date Value Ref Range Status   04/23/2019 25 22 - 31 mmol/L Final     Glucose   Date Value Ref Range Status   04/23/2019 136 (H) 70 - 110 mg/dL Final     Comment:     The ADA recommends the following guidelines for fasting glucose:  Normal:       less than 100 mg/dL  Prediabetes:  100 mg/dL to 125 mg/dL  Diabetes:     126 mg/dL or higher       BUN, Bld   Date Value Ref Range Status   04/23/2019 16 9 - 21 mg/dL Final     Creatinine   Date Value Ref Range Status   04/23/2019 0.80 0.50 - 1.40 mg/dL Final     Calcium   Date Value Ref Range Status   04/23/2019 9.9 8.4 - 10.2 mg/dL Final     Total Protein   Date Value Ref Range Status   04/23/2019 7.5 6.0 - 8.4 g/dL Final     Albumin   Date Value Ref Range Status   04/23/2019 4.3 3.5 - 5.2 g/dL Final     Total Bilirubin   Date Value Ref Range Status   04/23/2019 0.9 0.2 - 1.3 mg/dL Final     Alkaline Phosphatase   Date Value Ref Range Status   04/23/2019 96 38 - 145 U/L Final     AST   Date Value Ref Range Status   04/23/2019 23 17 - 59 U/L Final     ALT   Date Value Ref Range Status   04/23/2019 12 10 - 44 U/L Final     Anion Gap   Date Value Ref Range Status   04/23/2019 10 8 - 16 mmol/L Final     eGFR if    Date Value Ref Range Status   04/23/2019 >60 >60 mL/min/1.73 m^2 Final     eGFR if non    Date Value Ref Range Status   04/23/2019 >60 >60 mL/min/1.73 m^2 Final     Comment:     Calculation used to obtain the estimated glomerular filtration  rate (eGFR) is the CKD-EPI equation.        Magnesium:  1.7    Impression:  1.  Stage III adenocarcinoma of the pancreas.  2.  Obstructive jaundice-status post stent placement.  3.  Protein/calorie malnutrition - instructions given to wife & patient to increase calories & protein in  daily diet, continue Megace  4.  Marginal performance status (KPS 60%).    Plan:  1.  Proceed with Cycle 1, Day 8 of Abraxane 125 milligram/meter squared (190 mg) IV & Gemzar 1000 milligram/meter squared (1520 mg) IV.  2.  Premedication of 1 mg of Kytril.  Compazine prn.  3.  Continue Megace for stimulation of appetite.  4.  Return to clinic 1 week with interval CBC, CMP, and magnesium for evaluation prior to Cycle 1, Day 15 of treatment.    Assessment/plan reviewed and approved by Dr. Garsia.

## 2019-04-30 NOTE — PLAN OF CARE
Problem: Adult Inpatient Plan of Care  Goal: Plan of Care Review  Outcome: Ongoing (interventions implemented as appropriate)  Adequate for discharge.   Pt tolerated Abraxane and Gemzar infusions without noted distress.  Reviewed upcoming appointments.  All questions answered. Advised to call MD with any questions or concerns.   Ambulated from infusion center independently with wife.

## 2019-04-30 NOTE — PROGRESS NOTES
"Chief complaint:  Adenocarcinoma of the pancreas    History of present illness:  Patient is a 73-year-old thin, frail, black gentleman known to Dr. 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  The patient recently presented with obstructive jaundice that required an implantation of a biliary stent.  He has biopsy-proven adenocarcinoma of the head of the pancreas. There is no evidence of liver or other distant metastasis.  Patient has been placed on a trial of neoadjuvant chemotherapy consisting of Gemzar/Abraxane in light of his relatively poor performance status and recommendations of the via software.  Patient received his first dose, Cycle 1, Day 1 on 04/16/19.      He presents to the clinic today with his wife for evaluation prior to Cycle 1, Day 15 of treatment.  He denies any difficulties with fevers, chills, abdominal discomfort, diarrhea, constipation, arthralgias, myalgias, peripheral neuropathy, skin rashes, mouth sores, etc.  His appetite remains poor even with Megace.  Reinforced the need to maintain good daily hydration and a diet with calories to sustain.  Reinforced the need to have a "perform status" to remain on treatment.  No other complaints for pertinent findings on a 14 point review of systems.       Malignant neoplasm of head of pancreas    3/11/2019 Initial Diagnosis     Malignant neoplasm of head of pancreas         4/16/2019 -  Chemotherapy     Treatment Summary   Plan Name: OP PANC NAB-PACLITAXEL + GEMCITABINE Q4W  Treatment Goal: Curative  Status: Active  Start Date: 4/16/2019  End Date: 7/23/2019 (Planned)  Provider: Cristhian Garsia MD  Chemotherapy: PACLitaxel-protein bound (ABRAXANE) 125 mg/m2 = 190 mg in 38 mL infusion, 125 mg/m2 = 190 mg, Intravenous, Clinic/HOD 1 time, 1 of 4 cycles  Administration: 190 mg (4/16/2019), 190 mg (4/23/2019), 190 mg (4/30/2019)  gemcitabine " (GEMZAR) 1,520 mg in sodium chloride 0.9% 250 mL chemo infusion, 1,000 mg/m2 = 1,520 mg, Intravenous, Clinic/HOD 1 time, 1 of 4 cycles  Administration: 1,520 mg (4/16/2019), 1,520 mg (4/23/2019), 1,520 mg (4/30/2019)          Physical examination:  GENERAL:  Well-developed, thin, frail-appearing, black gentleman in NAD.  Alert & oriented x 3.  Use of a pronged cane for ambulation.  VITAL SIGNS: Weight:  Loss of 1 1/2 pounds in 1 weeks  Wt Readings from Last 3 Encounters:   04/30/19 49.2 kg (108 lb 7.5 oz)   04/30/19 49.2 kg (108 lb 7.5 oz)   04/23/19 49.9 kg (110 lb 0.2 oz)     Temp Readings from Last 3 Encounters:   04/30/19 97.5 °F (36.4 °C)   04/30/19 97.5 °F (36.4 °C)   04/23/19 97.5 °F (36.4 °C)     BP Readings from Last 3 Encounters:   04/30/19 102/75   04/30/19 98/72   04/23/19 121/79     Pulse Readings from Last 3 Encounters:   04/30/19 86   04/30/19 99   04/23/19 88     HEENT: Normocephalic, atraumatic. Oral mucosa pink and moist. Lips without   lesions. Tongue midline. Oropharynx clear. Nonicteric sclerae.   NECK: Supple, no adenopathy. No carotid bruits, thyromegaly or thyroid nodule.   HEART: Regular rate and rhythm without murmur, gallop or rub.   LUNGS: Clear to auscultation bilaterally. Normal respiratory effort.   ABDOMEN: Soft, nontender, nondistended with positive normoactive bowel sounds,   no hepatosplenomegaly.   EXTREMITIES: No cyanosis, clubbing or edema. Distal pulses are intact.   AXILLAE AND GROIN: No palpable pathologic lymphadenopathy is appreciated.   SKIN: Intact/turgor normal   NEUROLOGIC: Cranial nerves II-XII grossly intact. Motor:  Loss of muscle bulk and   tone. Strength/sensory 5/5 throughout. Gait unsteady/patient ambulates with a cane.     Laboratory:  Lab Results   Component Value Date    WBC 2.78 (L) 04/30/2019    HGB 14.6 04/30/2019    HCT 45.2 04/30/2019    MCV 93 04/30/2019     (L) 04/30/2019     Unremarkable differential; ANC = 1451    CMP  Sodium   Date Value Ref  Range Status   04/30/2019 136 136 - 145 mmol/L Final     Potassium   Date Value Ref Range Status   04/30/2019 3.7 3.5 - 5.1 mmol/L Final     Chloride   Date Value Ref Range Status   04/30/2019 96 95 - 110 mmol/L Final     CO2   Date Value Ref Range Status   04/30/2019 30 22 - 31 mmol/L Final     Glucose   Date Value Ref Range Status   04/30/2019 108 70 - 110 mg/dL Final     Comment:     The ADA recommends the following guidelines for fasting glucose:  Normal:       less than 100 mg/dL  Prediabetes:  100 mg/dL to 125 mg/dL  Diabetes:     126 mg/dL or higher       BUN, Bld   Date Value Ref Range Status   04/30/2019 13 9 - 21 mg/dL Final     Creatinine   Date Value Ref Range Status   04/30/2019 0.68 0.50 - 1.40 mg/dL Final     Calcium   Date Value Ref Range Status   04/30/2019 9.8 8.4 - 10.2 mg/dL Final     Total Protein   Date Value Ref Range Status   04/30/2019 7.1 6.0 - 8.4 g/dL Final     Albumin   Date Value Ref Range Status   04/30/2019 4.2 3.5 - 5.2 g/dL Final     Total Bilirubin   Date Value Ref Range Status   04/30/2019 0.7 0.2 - 1.3 mg/dL Final     Alkaline Phosphatase   Date Value Ref Range Status   04/30/2019 104 38 - 145 U/L Final     AST   Date Value Ref Range Status   04/30/2019 28 17 - 59 U/L Final     ALT   Date Value Ref Range Status   04/30/2019 22 10 - 44 U/L Final     Anion Gap   Date Value Ref Range Status   04/30/2019 10 8 - 16 mmol/L Final     eGFR if    Date Value Ref Range Status   04/30/2019 >60 >60 mL/min/1.73 m^2 Final     eGFR if non    Date Value Ref Range Status   04/30/2019 >60 >60 mL/min/1.73 m^2 Final     Comment:     Calculation used to obtain the estimated glomerular filtration  rate (eGFR) is the CKD-EPI equation.        Magnesium:  1.9    Impression:  1.  Stage III adenocarcinoma of the pancreas.  2.  Obstructive jaundice-status post stent placement.  3.  Protein/calorie malnutrition - instructions given to wife & patient to increase calories &  protein in daily diet, continue Megace  4.  Marginal performance status (KPS 60%).    Plan:  1.  Proceed with Cycle 1, Day 15 of Abraxane 125 milligram/meter squared (190 mg) IV & Gemzar 1000 milligram/meter squared (1520 mg) IV.  2.  Premedication of 1 mg of Kytril.  Compazine prn.  3.  Continue Megace for stimulation of appetite.  4.  Return to clinic 2 weeks with interval CBC, CMP, LDH and magnesium for evaluation prior to Cycle 2, Day 1 of treatment.  5.  Increase calorie intake and protein supplementation.    Assessment/plan reviewed and approved by Dr. Shepherd.

## 2019-04-30 NOTE — PROGRESS NOTES
Nutrition: Met with patient today per Brijesh Olivas NP request regarding how to gain weight. Will follow up with patient while he is here next week for infusion. Flako RaeMS, RD, LDN

## 2019-05-14 PROBLEM — E83.42 HYPOMAGNESEMIA: Status: ACTIVE | Noted: 2019-01-01

## 2019-05-14 PROBLEM — R00.0 TACHYCARDIA: Status: ACTIVE | Noted: 2019-01-01

## 2019-05-14 PROBLEM — R53.1 WEAKNESS: Status: ACTIVE | Noted: 2019-01-01

## 2019-05-14 NOTE — PROGRESS NOTES
"Chief complaint:  Adenocarcinoma of the pancreas    History of present illness:  Patient is a 73-year-old thin, frail, black gentleman known to Dr. 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  The patient recently presented with obstructive jaundice that required an implantation of a biliary stent.  He has biopsy-proven adenocarcinoma of the head of the pancreas. There is no evidence of liver or other distant metastasis.  Patient has been placed on a trial of neoadjuvant chemotherapy consisting of Gemzar/Abraxane in light of his relatively poor performance status and recommendations of the via software.  Patient received his first dose, Cycle 1, Day 1 on 04/16/19.      He presents to the clinic today for evaluation prior to Cycle 2, Day 1 of treatment appearing weak.  TPA was placed in port due to inability to receive a blood return.  Poor veins would not yield lab draw.  He denies any difficulties with fevers, chills, abdominal discomfort, diarrhea, constipation, arthralgias, myalgias, peripheral neuropathy, skin rashes, mouth sores, etc.  His appetite remains poor even with Megace.  Reinforced the need to maintain good daily hydration and a diet with calories to sustain.  Reinforced the need to have a "perform status" to remain on treatment.  Needs assistance to transfer.  No other complaints for pertinent findings on a 14 point review of systems.       Malignant neoplasm of head of pancreas    3/11/2019 Initial Diagnosis     Malignant neoplasm of head of pancreas         4/16/2019 -  Chemotherapy     Treatment Summary   Plan Name: OP PANC NAB-PACLITAXEL + GEMCITABINE Q4W  Treatment Goal: Curative  Status: Active  Start Date: 4/16/2019  End Date: 7/30/2019 (Planned)  Provider: Cristhian Garsia MD  Chemotherapy: PACLitaxel-protein bound (ABRAXANE) 125 mg/m2 = 190 mg in 38 mL infusion, 125 mg/m2 = 190 " mg, Intravenous, Clinic/HOD 1 time, 2 of 4 cycles  Administration: 190 mg (4/16/2019), 190 mg (4/23/2019), 190 mg (4/30/2019)  gemcitabine (GEMZAR) 1,520 mg in sodium chloride 0.9% 250 mL chemo infusion, 1,000 mg/m2 = 1,520 mg, Intravenous, Clinic/HOD 1 time, 2 of 4 cycles  Administration: 1,520 mg (4/16/2019), 1,520 mg (4/23/2019), 1,520 mg (4/30/2019)          Physical examination:  GENERAL:  Well-developed, thin, frail-appearing, black gentleman in NAD.  Alert & oriented x 3.  Use of a pronged cane for ambulation.  VITAL SIGNS: Weight:  Stable  Wt Readings from Last 3 Encounters:   05/14/19 49.2 kg (108 lb 7.5 oz)   05/14/19 49.2 kg (108 lb 7.5 oz)   04/30/19 49.2 kg (108 lb 7.5 oz)     Temp Readings from Last 3 Encounters:   05/14/19 97.7 °F (36.5 °C)   05/14/19 97.7 °F (36.5 °C)   04/30/19 97.5 °F (36.4 °C)     BP Readings from Last 3 Encounters:   05/14/19 (!) 129/92   05/14/19 (!) 129/92   04/30/19 102/75     Pulse Readings from Last 3 Encounters:   05/14/19 103   05/14/19 103   04/30/19 86     HEENT: Normocephalic, atraumatic. Oral mucosa pink and moist. Lips without   lesions. Tongue midline. Oropharynx clear. Nonicteric sclerae.   NECK: Supple, no adenopathy. No carotid bruits, thyromegaly or thyroid nodule.   HEART: Regular rate and rhythm without murmur, gallop or rub.   LUNGS: Clear to auscultation bilaterally. Normal respiratory effort.   ABDOMEN: Soft, flat, nontender, nondistended with positive normoactive bowel sounds,   no hepatosplenomegaly.   EXTREMITIES: No cyanosis, clubbing or edema. Distal pulses are intact.   AXILLAE AND GROIN: No palpable pathologic lymphadenopathy is appreciated.   SKIN: Intact/turgor normal   NEUROLOGIC: Cranial nerves II-XII grossly intact. Motor:  Loss of muscle bulk and   tone. Strength/sensory 5/5 throughout. Gait unsteady/patient ambulates with a cane.     Laboratory:  Lab Results   Component Value Date    WBC 15.34 (H) 05/14/2019    HGB 13.7 (L) 05/14/2019    HCT  41.6 05/14/2019    MCV 92 05/14/2019     05/14/2019     Differential remarkable for 86.7% grans, 5.2% lymph; ANC = 13.3    CMP  Sodium   Date Value Ref Range Status   05/14/2019 138 136 - 145 mmol/L Final     Potassium   Date Value Ref Range Status   05/14/2019 4.1 3.5 - 5.1 mmol/L Final     Comment:     Specimen slightly hemolyzed     Chloride   Date Value Ref Range Status   05/14/2019 103 95 - 110 mmol/L Final     CO2   Date Value Ref Range Status   05/14/2019 28 22 - 31 mmol/L Final     Glucose   Date Value Ref Range Status   05/14/2019 123 (H) 70 - 110 mg/dL Final     Comment:     The ADA recommends the following guidelines for fasting glucose:  Normal:       less than 100 mg/dL  Prediabetes:  100 mg/dL to 125 mg/dL  Diabetes:     126 mg/dL or higher       BUN, Bld   Date Value Ref Range Status   05/14/2019 15 9 - 21 mg/dL Final     Creatinine   Date Value Ref Range Status   05/14/2019 0.47 (L) 0.50 - 1.40 mg/dL Final     Calcium   Date Value Ref Range Status   05/14/2019 9.3 8.4 - 10.2 mg/dL Final     Total Protein   Date Value Ref Range Status   05/14/2019 6.8 6.0 - 8.4 g/dL Final     Albumin   Date Value Ref Range Status   05/14/2019 3.7 3.5 - 5.2 g/dL Final     Total Bilirubin   Date Value Ref Range Status   05/14/2019 1.0 0.2 - 1.3 mg/dL Final     Alkaline Phosphatase   Date Value Ref Range Status   05/14/2019 277 (H) 38 - 145 U/L Final     AST   Date Value Ref Range Status   05/14/2019 469 (H) 17 - 59 U/L Final     ALT   Date Value Ref Range Status   05/14/2019 361 (H) 10 - 44 U/L Final     Anion Gap   Date Value Ref Range Status   05/14/2019 7 (L) 8 - 16 mmol/L Final     eGFR if    Date Value Ref Range Status   05/14/2019 >60 >60 mL/min/1.73 m^2 Final     eGFR if non    Date Value Ref Range Status   05/14/2019 >60 >60 mL/min/1.73 m^2 Final     Comment:     Calculation used to obtain the estimated glomerular filtration  rate (eGFR) is the CKD-EPI equation.         Magnesium:  1.5    Impression:  1.  Stage III adenocarcinoma of the pancreas.  2.  Obstructive jaundice-status post stent placement.  3.  Protein/calorie malnutrition - instructions given to patient to increase calories & protein in daily diet, continue Megace  4.  Marginal performance status (KPS 60%).  5.  Elevated Alk phos & liver enzymes -   6.  Hypomagnesemia - replace with 2 gm Magnesium sulfate  7.  Tachycardic/Weak - will hydrate    Plan:  1.  HOLD chemotherapy.  2.  Hydrate with 1 L NS over 2 hrs.  Magnesium 2 gm IV.  3.  Continue Megace for stimulation of appetite.  4.  Return to clinic 1 week with interval CBC, CMP, LDH and magnesium for evaluation.  5.  Increase calorie intake and protein supplementation; stop alcohol consumption.  6.  Any change in mental status - report to ER for evaluation.    Assessment/plan reviewed and approved by Dr. Shepherd.

## 2019-05-15 NOTE — PROGRESS NOTES
Nutrition: Met with patient on 5/14/2019 to discuss patients oral intake. Patient informed me he is eating. States he cannot taste foods and is forcing himself to eat. WT: 108# Weight loss of 3#s since the start of treatment. Offered support and encouragement. Will follow up at next appointment. Will attempt to meet with family members. Madisyn Rae,MS, RD, LDN

## 2019-05-21 PROBLEM — D64.81 ANTINEOPLASTIC CHEMOTHERAPY INDUCED ANEMIA: Status: ACTIVE | Noted: 2019-01-01

## 2019-05-21 PROBLEM — T45.1X5A ANTINEOPLASTIC CHEMOTHERAPY INDUCED ANEMIA: Status: ACTIVE | Noted: 2019-01-01

## 2019-05-21 NOTE — PLAN OF CARE
Problem: Adult Inpatient Plan of Care  Goal: Plan of Care Review  Outcome: Ongoing (interventions implemented as appropriate)  Adequate for discharge.   Pt tolerated Abraxane and Gemzar infusions without noted distress.  Reviewed upcoming appointments.  All questions answered. Advised to call MD with any questions or concerns.   Escorted from infusion center by MA to lobby where pt's wife was waiting.

## 2019-05-21 NOTE — PROGRESS NOTES
History of present illness:  The patient is a 73-year-old black gentleman well known to me for potentially resectable adenocarcinoma of the colon.  Patient has been started on neoadjuvant Gemzar/Abraxane.  He completed 1 cycle of therapy.  Initiation of cycle 2 was delayed due to generalized weakness and mild dehydration for which the patient received intravenous fluids.  He returns to clinic for interval reassessment in hopes of resuming chemotherapy.  He is aggressively using nutritional supplements and reports being able to gain some weight.  Sensation of taste is altered while on chemotherapy.  Patient has had a fall after tripping on his dog and remains generally weak.  Unfortunately, tumor marker had risen by nearly a 1000 points between cycles 1 and 2.  No other complaints for pertinent findings on a 14 point review of systems.    Physical examination:  Well-developed, elderly, thin/frail-appearing black gentleman with a weight of 110 lb (stable).  VITAL SIGNS: Documented  and reviewed this visit.  HEENT:  Bitemporal wasting, atraumatic. Oral mucosa pink and moist. Lips without   lesions. Tongue midline. Oropharynx clear. Nonicteric sclerae.   NECK: Supple, no adenopathy. No carotid bruits, thyromegaly or thyroid nodule.   HEART: Regular rate and rhythm without murmur, gallop or rub.   LUNGS: Clear to auscultation bilaterally. Normal respiratory effort.   ABDOMEN: Soft, nontender, nondistended with positive normoactive bowel sounds,   no hepatosplenomegaly.   EXTREMITIES: No cyanosis, clubbing or edema. Distal pulses are intact. Increased bony prominences consistent with weight loss.    AXILLAE AND GROIN: No palpable pathologic lymphadenopathy is appreciated.   SKIN: Intact/turgor normal   NEUROLOGIC:  No focal deficits appreciated.    Laboratory:  White count 14.3, hemoglobin 12.4, hematocrit 37.2, platelets 364, absolute neutrophil count 10,400.  Sodium 138, potassium 4.1, chloride 101, CO2 31, BUN 11,  creatinine 0.6, glucose 121, calcium 9.4, magnesium 1.8, liver function tests remarkable for an ALT of 57, otherwise within normal limits.  LDH is 393, GFR is greater than 60.  Most recent CA -19 9 is 2459.    Impression:  1.  Potentially resectable adenocarcinoma of the pancreas.  2.  Chemotherapy associated anemia.  3.  General frailty.  4.  Protein/calorie malnutrition.    Plan:  1.  Proceed with cycle 2, day 1 of Gemzar/Abraxane.  2.  Continue use of nutritional supplements.  3.  No intervention for anemia required at this time.  4.  Return to clinic in 1 week with interval CBC, CMP, and magnesium at which time we will proceed with cycle 2, day 8 of therapy.  5.  I plan to reimage the patient after completion of this cycle of chemotherapy and assess the appropriateness of him continuing aggressive treatment.    This note was created using voice recognition software and may contain grammatical errors.

## 2019-05-28 NOTE — PLAN OF CARE
Problem: Adult Inpatient Plan of Care  Goal: Plan of Care Review  Outcome: Ongoing (interventions implemented as appropriate)  Adequate for discharge.   Pt tolerated  infusion without noted distress.  Reviewed upcoming appointments.  All questions answered. Advised to call MD with any questions or concerns.   Ambulated from infusion

## 2019-05-28 NOTE — PROGRESS NOTES
History of present illness:  The patient is a 73-year-old black gentleman well known to me for potentially resectable adenocarcinoma of the pancreas.  Patient is receiving neoadjuvant Gemzar/Abraxane and is in clinic for cycle 2, day 8 of therapy.  Appetite remains poor, and the patient continues to lose weight despite his efforts to force himself to eat.  He denies pain, nausea, vomiting, diarrhea, mouth sores, etc.  No other pertinent findings on a 14 point review of systems.  Physical examination:  Well-developed, elderly, thin/frail-appearing black gentleman with a weight of 105.5 lb (decreased by 4.5 lb).  VITAL SIGNS: Documented  and reviewed this visit.  HEENT:  Bitemporal wasting, atraumatic. Oral mucosa pink and moist. Lips without   lesions. Tongue midline. Oropharynx clear. Nonicteric sclerae.   NECK: Supple, no adenopathy. No carotid bruits, thyromegaly or thyroid nodule.   HEART: Regular rate and rhythm without murmur, gallop or rub.   LUNGS: Clear to auscultation bilaterally. Normal respiratory effort.   ABDOMEN: Soft, nontender, nondistended with positive normoactive bowel sounds,   no hepatosplenomegaly.   EXTREMITIES: No cyanosis, clubbing or edema. Distal pulses are intact. Increased bony prominences consistent with weight loss.    AXILLAE AND GROIN: No palpable pathologic lymphadenopathy is appreciated.   SKIN: Intact/turgor normal   NEUROLOGIC:  No focal deficits appreciated.    Laboratory:  White count 9.8, hemoglobin 10.9, hematocrit 33.7, platelets 261.  Absolute neutrophil count is 8000.  Sodium 139, potassium 3.5, chloride 102, CO2 30, BUN 9, creatinine 0.5, glucose 166, calcium 9.1, magnesium 1.7, alkaline phosphatase 210, the remainder of the liver function tests are within normal limits, GFR greater than 60.    Impression:  1.  Potentially resectable adenocarcinoma of the pancreas.  2.  Chemotherapy associated anemia.  3.  General frailty.  4.  Protein/calorie malnutrition.    Plan:  1.   Proceed with cycle 2, day 8 of Gemzar/Abraxane.  2.  Continue use of nutritional supplements.  3.  Return to clinic in 1 week with interval CBC and iron studies.  4.  I plan to reimage the patient after completion of this cycle of chemotherapy and assess the appropriateness of him continuing aggressive treatment.    This note was created using voice recognition software and may contain grammatical errors.

## 2019-06-04 PROBLEM — K83.1 BILIARY OBSTRUCTION DUE TO CANCER: Status: ACTIVE | Noted: 2019-01-01

## 2019-06-04 PROBLEM — A41.9 SEPSIS: Status: ACTIVE | Noted: 2019-01-01

## 2019-06-04 PROBLEM — C80.1 BILIARY OBSTRUCTION DUE TO CANCER: Status: ACTIVE | Noted: 2019-01-01

## 2019-06-04 PROBLEM — R65.20 SEVERE SEPSIS: Status: ACTIVE | Noted: 2019-01-01

## 2019-06-04 PROBLEM — A41.50 SEPSIS DUE TO GRAM-NEGATIVE ORGANISM WITH SEPTIC SHOCK: Status: ACTIVE | Noted: 2019-01-01

## 2019-06-04 PROBLEM — R79.89 ELEVATED LFTS: Status: ACTIVE | Noted: 2019-01-01

## 2019-06-04 PROBLEM — R65.21 SEPSIS DUE TO GRAM-NEGATIVE ORGANISM WITH SEPTIC SHOCK: Status: ACTIVE | Noted: 2019-01-01

## 2019-06-04 PROBLEM — E87.6 HYPOKALEMIA: Status: ACTIVE | Noted: 2019-01-01

## 2019-06-10 NOTE — PROGRESS NOTES
[6/10/2019 8:45 AM]  HUNG Gomez:    Nicholas Lawler, [id number]. He is now on hospice.     [6/10/2019 8:45 AM]    ok thank you

## 2019-06-10 NOTE — TELEPHONE ENCOUNTER
----- Message from Pennie Dickinson sent at 6/10/2019  1:18 PM CDT -----  Type: Needs Medical Advice    Who Called:  Mercedes with [facility]  Best Call Back Number: [phone]  Additional Information: caller is admitting patient to [facility] and would like to know if doctor will sign the CTI

## 2019-06-10 NOTE — TELEPHONE ENCOUNTER
Spoke w Shantel @ [facility].  Informed Shantel that Dr. Garsia will sign CTI paper, if she faxes over.  Shantel, verbalized understanding, and will fax over paper work.

## 2019-07-10 ENCOUNTER — TELEPHONE (OUTPATIENT)
Dept: HEMATOLOGY/ONCOLOGY | Facility: CLINIC | Age: 74
End: 2019-07-10

## 2019-07-10 NOTE — TELEPHONE ENCOUNTER
----- Message from Rich Alegria sent at 7/10/2019  9:31 AM CDT -----  Contact: April with [facility]  Type: Needs Medical Advice    Who Called:  April    Best Call Back Number: [phone]  Additional Information: Notifying Dr. Garsia that the pt has passed away. Please call to advise

## 2019-07-10 NOTE — TELEPHONE ENCOUNTER
Noted    ----- Message from Rich Alegria sent at 7/10/2019  9:31 AM CDT -----  Contact: April with [facility]  Type: Needs Medical Advice    Who Called:  April    Best Call Back Number: [phone]  Additional Information: Notifying Dr. Garsia that the pt has passed away. Please call to advise

## (undated) DEVICE — DRAPE C-ARM/MOBILE XRAY 44X80

## (undated) DEVICE — ELECTRODE REM PLYHSV RETURN 9

## (undated) DEVICE — SEE MEDLINE ITEM 156955

## (undated) DEVICE — ADHESIVE DERMABOND ADVANCED

## (undated) DEVICE — SYR 30CC LUER LOCK

## (undated) DEVICE — SEE MEDLINE ITEM 152622

## (undated) DEVICE — COVER OVERHEAD SURG LT BLUE

## (undated) DEVICE — ADAPTER CATH SYRINGE

## (undated) DEVICE — NDL HYPDRM 23X15

## (undated) DEVICE — SET DECANTER MEDICHOICE

## (undated) DEVICE — SHEET THYROID W/ISO-BAC

## (undated) DEVICE — SUT PROLENE 2-0 SH 36IN BLU

## (undated) DEVICE — SUT 2-0 ETHILON 18 FS

## (undated) DEVICE — SUT MCRYL PLUS 4-0 PS2 27IN

## (undated) DEVICE — GLOVE PROTEXIS LTX MICRO  7.5

## (undated) DEVICE — SUT VICRYL 3-0 27 SH

## (undated) DEVICE — SUT 2/0 27IN PDS II VIO MO

## (undated) DEVICE — SYR 10CC LUER LOCK

## (undated) DEVICE — SEE MEDLINE ITEM 157117

## (undated) DEVICE — SUT MONOCRYL 3-0 PS-2 UND

## (undated) DEVICE — MANIFOLD 4 PORT

## (undated) DEVICE — SEE MEDLINE ITEM 157116

## (undated) DEVICE — PACK BASIC

## (undated) DEVICE — APPLICATOR CHLORAPREP ORN 26ML

## (undated) DEVICE — COVER PROBE 6X48